# Patient Record
Sex: FEMALE | Race: WHITE | NOT HISPANIC OR LATINO | Employment: OTHER | URBAN - METROPOLITAN AREA
[De-identification: names, ages, dates, MRNs, and addresses within clinical notes are randomized per-mention and may not be internally consistent; named-entity substitution may affect disease eponyms.]

---

## 2017-04-15 ENCOUNTER — APPOINTMENT (OUTPATIENT)
Dept: CT IMAGING | Facility: CLINIC | Age: 82
End: 2017-04-15
Attending: EMERGENCY MEDICINE
Payer: MEDICARE

## 2017-04-15 ENCOUNTER — APPOINTMENT (OUTPATIENT)
Dept: MRI IMAGING | Facility: CLINIC | Age: 82
End: 2017-04-15
Attending: EMERGENCY MEDICINE
Payer: MEDICARE

## 2017-04-15 ENCOUNTER — HOSPITAL ENCOUNTER (EMERGENCY)
Facility: CLINIC | Age: 82
Discharge: HOME | End: 2017-04-15
Attending: EMERGENCY MEDICINE | Admitting: EMERGENCY MEDICINE
Payer: MEDICARE

## 2017-04-15 VITALS
BODY MASS INDEX: 19.07 KG/M2 | WEIGHT: 101 LBS | SYSTOLIC BLOOD PRESSURE: 148 MMHG | OXYGEN SATURATION: 98 % | HEIGHT: 61 IN | HEART RATE: 54 BPM | TEMPERATURE: 98.4 F | DIASTOLIC BLOOD PRESSURE: 84 MMHG | RESPIRATION RATE: 15 BRPM

## 2017-04-15 DIAGNOSIS — R26.9 ABNORMALITY OF GAIT: ICD-10-CM

## 2017-04-15 DIAGNOSIS — R42 DIZZINESS: ICD-10-CM

## 2017-04-15 DIAGNOSIS — I65.21 INTERNAL CAROTID ARTERY STENOSIS, RIGHT: ICD-10-CM

## 2017-04-15 DIAGNOSIS — R51.9 NONINTRACTABLE EPISODIC HEADACHE, UNSPECIFIED HEADACHE TYPE: ICD-10-CM

## 2017-04-15 LAB
ALBUMIN SERPL-MCNC: 3.8 G/DL (ref 3.4–5)
ALBUMIN UR-MCNC: NEGATIVE MG/DL
ALP SERPL-CCNC: 57 U/L (ref 40–150)
ALT SERPL W P-5'-P-CCNC: 23 U/L (ref 0–50)
ANION GAP SERPL CALCULATED.3IONS-SCNC: 8 MMOL/L (ref 3–14)
APPEARANCE UR: CLEAR
AST SERPL W P-5'-P-CCNC: 20 U/L (ref 0–45)
BASOPHILS # BLD AUTO: 0 10E9/L (ref 0–0.2)
BASOPHILS NFR BLD AUTO: 0.6 %
BILIRUB SERPL-MCNC: 0.9 MG/DL (ref 0.2–1.3)
BILIRUB UR QL STRIP: NEGATIVE
BUN SERPL-MCNC: 30 MG/DL (ref 7–30)
CALCIUM SERPL-MCNC: 9.3 MG/DL (ref 8.5–10.1)
CHLORIDE SERPL-SCNC: 103 MMOL/L (ref 94–109)
CO2 SERPL-SCNC: 27 MMOL/L (ref 20–32)
COLOR UR AUTO: ABNORMAL
CREAT SERPL-MCNC: 1.05 MG/DL (ref 0.52–1.04)
DIFFERENTIAL METHOD BLD: ABNORMAL
EOSINOPHIL # BLD AUTO: 0.2 10E9/L (ref 0–0.7)
EOSINOPHIL NFR BLD AUTO: 3 %
ERYTHROCYTE [DISTWIDTH] IN BLOOD BY AUTOMATED COUNT: 12.5 % (ref 10–15)
GFR SERPL CREATININE-BSD FRML MDRD: 50 ML/MIN/1.7M2
GLUCOSE SERPL-MCNC: 110 MG/DL (ref 70–99)
GLUCOSE UR STRIP-MCNC: NEGATIVE MG/DL
HCT VFR BLD AUTO: 40.9 % (ref 35–47)
HGB BLD-MCNC: 14.5 G/DL (ref 11.7–15.7)
HGB UR QL STRIP: NEGATIVE
IMM GRANULOCYTES # BLD: 0 10E9/L (ref 0–0.4)
IMM GRANULOCYTES NFR BLD: 0.3 %
INTERPRETATION ECG - MUSE: NORMAL
KETONES UR STRIP-MCNC: NEGATIVE MG/DL
LEUKOCYTE ESTERASE UR QL STRIP: ABNORMAL
LYMPHOCYTES # BLD AUTO: 1.6 10E9/L (ref 0.8–5.3)
LYMPHOCYTES NFR BLD AUTO: 24.3 %
MCH RBC QN AUTO: 33.4 PG (ref 26.5–33)
MCHC RBC AUTO-ENTMCNC: 35.5 G/DL (ref 31.5–36.5)
MCV RBC AUTO: 94 FL (ref 78–100)
MONOCYTES # BLD AUTO: 0.6 10E9/L (ref 0–1.3)
MONOCYTES NFR BLD AUTO: 8.9 %
NEUTROPHILS # BLD AUTO: 4.2 10E9/L (ref 1.6–8.3)
NEUTROPHILS NFR BLD AUTO: 62.9 %
NITRATE UR QL: NEGATIVE
NRBC # BLD AUTO: 0 10*3/UL
NRBC BLD AUTO-RTO: 0 /100
NT-PROBNP SERPL-MCNC: 265 PG/ML (ref 0–1800)
PH UR STRIP: 6.5 PH (ref 5–7)
PLATELET # BLD AUTO: 233 10E9/L (ref 150–450)
POTASSIUM SERPL-SCNC: 4 MMOL/L (ref 3.4–5.3)
PROT SERPL-MCNC: 6.8 G/DL (ref 6.8–8.8)
RBC # BLD AUTO: 4.34 10E12/L (ref 3.8–5.2)
RBC #/AREA URNS AUTO: 0 /HPF (ref 0–2)
SODIUM SERPL-SCNC: 138 MMOL/L (ref 133–144)
SP GR UR STRIP: 1.01 (ref 1–1.03)
TROPONIN I SERPL-MCNC: NORMAL UG/L (ref 0–0.04)
URN SPEC COLLECT METH UR: ABNORMAL
UROBILINOGEN UR STRIP-MCNC: NORMAL MG/DL (ref 0–2)
WBC # BLD AUTO: 6.7 10E9/L (ref 4–11)
WBC #/AREA URNS AUTO: 1 /HPF (ref 0–2)

## 2017-04-15 PROCEDURE — 81001 URINALYSIS AUTO W/SCOPE: CPT | Performed by: EMERGENCY MEDICINE

## 2017-04-15 PROCEDURE — 96375 TX/PRO/DX INJ NEW DRUG ADDON: CPT | Mod: 59

## 2017-04-15 PROCEDURE — 99285 EMERGENCY DEPT VISIT HI MDM: CPT | Mod: 25

## 2017-04-15 PROCEDURE — 96361 HYDRATE IV INFUSION ADD-ON: CPT

## 2017-04-15 PROCEDURE — 25000125 ZZHC RX 250: Performed by: EMERGENCY MEDICINE

## 2017-04-15 PROCEDURE — 70498 CT ANGIOGRAPHY NECK: CPT

## 2017-04-15 PROCEDURE — 25000128 H RX IP 250 OP 636: Performed by: EMERGENCY MEDICINE

## 2017-04-15 PROCEDURE — 83880 ASSAY OF NATRIURETIC PEPTIDE: CPT | Performed by: EMERGENCY MEDICINE

## 2017-04-15 PROCEDURE — 93005 ELECTROCARDIOGRAM TRACING: CPT

## 2017-04-15 PROCEDURE — 25500064 ZZH RX 255 OP 636: Performed by: EMERGENCY MEDICINE

## 2017-04-15 PROCEDURE — 84484 ASSAY OF TROPONIN QUANT: CPT | Performed by: EMERGENCY MEDICINE

## 2017-04-15 PROCEDURE — A9585 GADOBUTROL INJECTION: HCPCS | Performed by: EMERGENCY MEDICINE

## 2017-04-15 PROCEDURE — 80053 COMPREHEN METABOLIC PANEL: CPT | Performed by: EMERGENCY MEDICINE

## 2017-04-15 PROCEDURE — 25000131 ZZH RX MED GY IP 250 OP 636 PS 637: Mod: GY | Performed by: EMERGENCY MEDICINE

## 2017-04-15 PROCEDURE — 96374 THER/PROPH/DIAG INJ IV PUSH: CPT | Mod: 59

## 2017-04-15 PROCEDURE — 70450 CT HEAD/BRAIN W/O DYE: CPT | Mod: XS

## 2017-04-15 PROCEDURE — A9270 NON-COVERED ITEM OR SERVICE: HCPCS | Mod: GY | Performed by: EMERGENCY MEDICINE

## 2017-04-15 PROCEDURE — 85025 COMPLETE CBC W/AUTO DIFF WBC: CPT | Performed by: EMERGENCY MEDICINE

## 2017-04-15 PROCEDURE — 70553 MRI BRAIN STEM W/O & W/DYE: CPT

## 2017-04-15 RX ORDER — ONDANSETRON 2 MG/ML
4 INJECTION INTRAMUSCULAR; INTRAVENOUS EVERY 30 MIN PRN
Status: DISCONTINUED | OUTPATIENT
Start: 2017-04-15 | End: 2017-04-15 | Stop reason: HOSPADM

## 2017-04-15 RX ORDER — IOPAMIDOL 755 MG/ML
70 INJECTION, SOLUTION INTRAVASCULAR ONCE
Status: COMPLETED | OUTPATIENT
Start: 2017-04-15 | End: 2017-04-15

## 2017-04-15 RX ORDER — MECLIZINE HYDROCHLORIDE 25 MG/1
25 TABLET ORAL ONCE
Status: COMPLETED | OUTPATIENT
Start: 2017-04-15 | End: 2017-04-15

## 2017-04-15 RX ORDER — LORAZEPAM 2 MG/ML
0.5 INJECTION INTRAMUSCULAR ONCE
Status: COMPLETED | OUTPATIENT
Start: 2017-04-15 | End: 2017-04-15

## 2017-04-15 RX ORDER — LIDOCAINE 40 MG/G
CREAM TOPICAL
Status: DISCONTINUED | OUTPATIENT
Start: 2017-04-15 | End: 2017-04-15 | Stop reason: HOSPADM

## 2017-04-15 RX ORDER — GADOBUTROL 604.72 MG/ML
5 INJECTION INTRAVENOUS ONCE
Status: COMPLETED | OUTPATIENT
Start: 2017-04-15 | End: 2017-04-15

## 2017-04-15 RX ADMIN — ONDANSETRON 4 MG: 2 SOLUTION INTRAMUSCULAR; INTRAVENOUS at 12:11

## 2017-04-15 RX ADMIN — SODIUM CHLORIDE 100 ML: 9 INJECTION, SOLUTION INTRAVENOUS at 11:37

## 2017-04-15 RX ADMIN — LORAZEPAM 0.5 MG: 2 INJECTION INTRAMUSCULAR; INTRAVENOUS at 13:44

## 2017-04-15 RX ADMIN — MECLIZINE HYDROCHLORIDE 25 MG: 25 TABLET ORAL at 10:51

## 2017-04-15 RX ADMIN — IOPAMIDOL 70 ML: 755 INJECTION, SOLUTION INTRAVENOUS at 11:38

## 2017-04-15 RX ADMIN — GADOBUTROL 5 ML: 604.72 INJECTION INTRAVENOUS at 14:28

## 2017-04-15 RX ADMIN — SODIUM CHLORIDE 1000 ML: 9 INJECTION, SOLUTION INTRAVENOUS at 10:47

## 2017-04-15 ASSESSMENT — ENCOUNTER SYMPTOMS
PALPITATIONS: 1
VOMITING: 0
ABDOMINAL PAIN: 0
ACTIVITY CHANGE: 0
HEADACHES: 1
DIZZINESS: 1
SHORTNESS OF BREATH: 0
WEAKNESS: 1
LIGHT-HEADEDNESS: 1
BACK PAIN: 1
NAUSEA: 1
FEVER: 0
CONSTIPATION: 1

## 2017-04-15 NOTE — DISCHARGE INSTRUCTIONS
Managing Balance Problems: Vestibular Rehabilitation Therapy  An inner ear problem can knock out part of the balance system. Vestibular rehabilitation therapy helps you learn how to rely on specific parts of your balance system.  Checking eye movement  The therapist will check for nystagmus. This is an automatic, jumpy eye movement. Nystagmus in certain positions can indicate an inner ear problem. It can even show which semicircular canal is affected. The therapist will watch your eyes while you move into different positions.  Treating your condition    Treatment can include:    Canalith repositioning, a series of guided head and body movements. It helps move crystals, easing benign positional vertigo (BPV) symptoms.    Habituation exercises to retrain your balance system. The therapist will teach you how to do these exercises at home.    Gaze stabilization exercises to retrain the eyes to stay in focus while the head moves. This helps ease dysequilibrium.    Gait and balance training, which includes standing and walking on different surfaces. The therapist can teach you how to maintain balance and prevent falls.  Doing habituation exercises  The therapist will teach exercises suited to your condition. Habituation exercises will make you dizzy at first. Just remind yourself that symptoms probably will last for only a minute. If you keep doing the exercises, they will help lessen your dizziness. Then, when you perform a similar movement in daily life, it is less likely to provoke symptoms.  Getting back into action  Dizziness doesn't have to keep you from exercising. Start with activities that don't trigger episodes. Then ease into more challenging activities. Try these tips:    Always exercise with a partner.    Stop if you have nausea or faintness.    Walk on a treadmill, holding on to the handles for support.    Use a ball machine for sports like tennis until you're ready for a live game. This way you know where  to expect the ball.    Don't give up. With time, most people can return to activities and sports.    1809-5042 [employer]. [address] [place] [address], [place] [address]. All rights reserved. This information is not intended as a substitute for professional medical care. Always follow your healthcare professional's instructions.          Dizziness (Vertigo) and Balance Problems: Ensuring Your Safety  Falls or accidents can lead to pain, broken bones, and fear of future falls. Protect yourself and others by preparing for episodes. Simple steps can help increase your safety at home and wherever you go.  Lighting    Keep all areas well lit. This helps your eyes send the right signals to the brain. It also makes you less likely to trip and fall. If bright lights make symptoms worse, dim the lights or lie in a dark room until the dizziness passes. Then turn the lights back to their normal level.  Tips:    Keep a flashlight by the bed.    Place nightlights in bathrooms and hallways.    Replace burned-out bulbs, or have someone replace them for you.  Fall prevention  To reduce your risk of falling:    Rise out of a bed or chair slowly.    Wear low-heeled shoes that fit properly and have slip-resistant soles.    Remove throw rugs. Clear clutter from walkways.    Use handrails on stairs. Have handrails installed or adjusted if needed.    Install grab bars in the bathroom. Don't use towel racks for balance.    Use a shower stool. Also, apply adhesive strips to the shower or tub floor.  Going out  With a little time and preparation, you can get around safely.  Tips:    Bring a cane or walking aid if needed.    Give yourself plenty of time in case a dizziness episode begins.    Be patient. If an activity like walking through a crowded shop causes you stress, you may not be ready for it yet.  Driving  If you become dizzy or disoriented while driving, you could hurt yourself and others. That's why it's best to avoid  driving until symptoms subside. In some cases, your license may be temporarily held until it's safe for you to drive again.  For safety:    Ask a friend to drive for you.    Take public transportation.    Walk to stores and other places when you can.  Asking for help  Don't be afraid to ask for help running errands, cooking meals, and doing exercise. Whether it's a friend, loved one, neighbor, or stranger on the street, a little help can make a world of difference.     3560-5344 [employer]. [address] [place] [address]. All rights reserved. This information is not intended as a substitute for professional medical care. Always follow your healthcare professional's instructions.          Dizziness (Uncertain Cause)  Dizziness is a common symptom. It may be described as lightheadedness, spinning, or feeling like you are going to faint. Dizziness can have many causes.  Be sure to tell the healthcare provider about:    All medicines you take, including prescription, over-the-counter, herbs, and supplements    Any other symptoms you have    Any health problems you are being treated for    Anything that causes the dizziness to get worse or better  Today's exam did not show an exact cause for your dizziness. Other tests may be needed. Follow up with your healthcare provider.  Home care    Dizziness that occurs with sudden standing may be a sign of mild dehydration. Drink extra fluids for the next few days.    If you recently started a new medicine, stopped a medicine, or had the dose of a current medicine changed, talk with the prescribing healthcare provider. Your medicine plan may need adjustment.    If dizziness lasts more than a few seconds, sit or lie down until it passes. This may help prevent injury in case you pass out.    Do not drive or use power tools or dangerous equipment until you have had no dizziness for at least 48 hours.  Follow-up care  Follow up with your healthcare provider  for further evaluation within the next 7 days or as advised.  When to seek medical advice  Call your healthcare provider for any of the following:    Worsening of symptoms or new symptoms    Passing out or seizure    Repeated vomiting    Headache    Palpitations (the sense that your heart is fluttering or beating fast or hard)    Shortness of breath    Blood in vomit or stool (black or red color)    Weakness of an arm or leg or one side of the face    Vision or hearing changes    Trouble walking or speaking    Chest, arm, neck, back, or jaw pain       3158-7358 [employer]. [address] [place], [place] [id number]. All rights reserved. This information is not intended as a substitute for professional medical care. Always follow your healthcare professional's instructions.

## 2017-04-15 NOTE — ED PROVIDER NOTES
"  History     Chief Complaint:  Palpitations      HPI   Susy Chavarria is a 85 year old female with a history of hypertension who presents with palpitations.  The patient reports about 3 weeks of feeling generally \"lousy\" with symptoms including intermittent dizziness, nausea, headache over her left forehead and down into the left side of her face, and palpitations which she describes as feeling her heartbeat pulsing in her chest and abdomen.  She has felt unsteady, wobbly, and weak when walking, like she might pass out although she has not actually done so.  She denies any sense of the room spinning or history of vertigo.  She has noticed her heart rate is sometimes irregular when she checks it on her machine at home although this does not correlate with the presence of her symptoms.  She is having these symptoms daily and over the past few days she has noticed worse symptoms in the morning which then improve as the day passes.  She has been seen in clinic twice for these same constellation of symptoms without specific cause found.  Her headache does feel somewhat like a sinus infection to her and is worse at night, sometimes waking her from sleep.  She was not treated with antibiotics as this was thought to be viral based on timing and an elevated lymphocyte count.  Her Metoprolol was recently changed from 50 mg at night to 25 mg twice a day.  She is unclear exactly why the change was made, possibly as her blood pressure was still on the high side.  She has had no other recent medication changes.  She denies any chest pain, shortness of breath, abdominal pain, or vomiting.  She denies any history of atrial fibrillation and has not had a Holter monitor or other prolonged monitoring of her heart.  She has had some blurring of her vision, worse in her right eye.  She had an eye exam last week which was normal, other than needing a new prescription for glasses.  She normally takes stool softeners due to chronic " "issues with constipation.  She has some low back pain from osteoarthritis that is unchanged from baseline.  She does not typically drink much water although has been trying to hydrate better.  She lives independently and is able to do her normal daily activities without difficulty.    Allergies:  No known drug allergies.     Medications:    Pravastatin  Metoprolol  Hydrochlorothiazide  Lisinopril   Aspirin  Omeprazole  Calcium carbonate-Vitamin D  Multivitamin     Past Medical History:    Hypertension  Hyperlipidemia     Past Surgical History:    Cataract removal  Joint replacement  Nephrectomy (donor)  Carpal tunnel release    Family History:    Cancer - father    Social History:  Marital Status:   Presents to the ED with her son.  Living Situation: senior independent living   Tobacco Use: No previous or current tobacco use.   Alcohol Use: Occasional alcohol use, about 1 drink per week  PCP: Inez Gonzales MD      Review of Systems   Constitutional: Negative for activity change and fever.   Eyes: Positive for visual disturbance.   Respiratory: Negative for shortness of breath.    Cardiovascular: Positive for palpitations. Negative for chest pain.   Gastrointestinal: Positive for constipation (chronic) and nausea. Negative for abdominal pain and vomiting.   Musculoskeletal: Positive for back pain (chronic).   Neurological: Positive for dizziness, weakness, light-headedness and headaches.   All other systems reviewed and are negative.    Physical Exam   First Vitals:  BP: 169/75  Pulse: 64  Temp: 98.4  F (36.9  C)  Resp: 16  Height: 154.9 cm (5' 1\")  Weight: 45.8 kg (101 lb)  SpO2: 95 %      Physical Exam  General: Patient is alert and normal appearing.  HEENT: Head atraumatic    Eyes: pupils equal and reactive. Conjunctiva clear   Nares: patent   Oropharynx: no lesions, uvula midline, no palatal draping, normal voice, no trismus  Neck: Supple without lymphadenopathy, no meningismus  Chest: Heart regular " rate and rhythm.   Lungs: Equal clear to auscultation with no wheeze or rales  Abdomen: Soft, non tender, nondistended, normal bowel sounds, no pulsatile mass, equal pulses bilaterally  Back: No costovertebral angle tenderness, no midline C, T or L spine tenderness  Neuro: Grossly nonfocal, normal speech, strength equal bilaterally, CN 2-12 intact, no pronator drift, normal finger to nose, + romberg, independent gait  Extremities: No deformities, equal radial and DP pulses. No clubbing, cyanosis.  No edema  Skin: Warm and dry with no rash.       Emergency Department Course   ECG:  @ 1019  Indication: palpitations  Vent. Rate 59 bpm. TX interval 182 ms. QRS duration 68 ms. QT/QTc 390/386 ms. P-R-T axis 44 -19 28.   Sinus bradycardia. Anterior infarct, age undetermined. Abnormal ECG.    Read @ 1025 by Dr. Virgen.     Imaging:  Head CT without contrast:  1. No acute abnormality.  2. Atrophy of the brain. White matter changes consistent with sequelae of small vessel ischemic disease.  Report per radiology.      Head/Neck CT angiogram with and without contrast:  1. No evidence of arterial occlusion.  2. There is 70% diameter stenosis of the proximal right internal carotid artery by NASCET criteria caused by atherosclerotic plaque.  3. Tortuous internal carotid and vertebral arteries.  Report per radiology.     Brain MRI without and with contrast:  1. No acute infarct or other abnormality.  2. Brain atrophy and white matter changes consistent with sequelae of small vessel ischemic disease.  Report per radiology.    Radiographic findings were communicated with the patient and family who voiced understanding of the findings.    Laboratory:  CBC: MCH 33.4 (H), otherwise WNL (WBC 6.7, HGB 14.5, )   CMP: Glucose 110 (H), Creatinine 1.05 (H), GFR 50 (L), otherwise WNL   Pro-BNP: 265  Troponin I: <0.015     UA: Clear light yellow urine, Leukocyte Esterase small, otherwise WNL     Interventions:  (1047) Normal Saline, 1  liter, IV bolus   (1051) Meclizine, 25 mg, PO  (1211) Zofran, 4 mg, IV injection   (1344) Ativan, 0.5 mg, IV injection     Emergency Department Course:  EKG was done in triage, interpretation as above.     Nursing notes and vitals reviewed.  I performed an exam of the patient as documented above.     A peripheral IV was established.  Blood was drawn and sent for laboratory testing, results as above. The patient was placed on continuous cardiac monitoring and pulse oximetry.     The patient was sent for a head CT and head/neck CT angiogram while in the emergency department, findings above.      Urine sample was obtained and sent for laboratory analysis, findings above.     The patient was sent for a brain MRI while in the emergency department, findings above.      I spoke with Dr. Bai of neurology regarding the patient.    Findings and plan explained to the patient and family. Patient discharged home with instructions regarding supportive care, medications, and reasons to return. The importance of close follow-up was reviewed.      Impression & Plan      Medical Decision Making:  Patient is an 85 year old female who lives independently, presents with vague symptoms of dizziness, imbalance, left sided headache, and intermittent nausea.  She denies chest pain, shortness of breath, abdominal pain, or pain radiating through to her back.  She denies any numbness, weakness, change in her vision or speech.  No history of vertigo in the past.  No falls or head injury.  Has seen her primary doctor for this a couple of times, initially thought to be a sinusitis and then thought to be medication related.  Additionally she has had symptoms of irregular heart rhythm readings on her monitor at home and feelings of palpitations.  Here in the emergency department she has actually been observed for about 6 hours to do her entire workup.  Her lowest heart rate was sinus bradycardia at 54, otherwise she has been in the upper 59's  to low 60's and in sinus rhythm for that entire time.  Her blood pressures have been very stable, most recently 148/84.  Her oxygen saturations on room air have been normal.  She had a very normal neurologic exam except for a positive Romberg.  After a dose of Meclizine she did have some imbalance but that improved during her stay in the emergency department.  Nausea was better with a dose of Zofran.  She did have a CT head that was negative for acute intracranial pathology.  CTA demonstrated 70% right internal carotid stenosis and she underwent MRI to ensure that there was no infarct and it was negative for acute infarct.  Patient's EKG had no new ischemic changes and just showed sinus bradycardia at a rate of 59.  Troponin was negative.  BNP was normal.  Electrolytes were within normal limits.  Her creatinine was 1.05 and she is known to have a solitary kidney.  That likely represents that and is baseline for her.  Urinalysis is negative for signs of infection.  She is not anemic with a hemoglobin of 14.5.  Her left sided headache: she has no tenderness to palpation to suggest temporal arteritis.  Her symptoms, she feels, are worse in the morning but she recently cut her evening dose of Metoprolol and takes a half dose in the morning so potentially that is a contributing factor for the patient.  I do not believe this consistent with acute coronary syndrome.  I did consider abdominal aortic aneurysm or dissection but do not feel her symptoms are consistent with this.  It has been 3 weeks of symptoms and I do not believe this to be PE or DVT as the likely cause of her symptoms.  I discussed with her following up with neurology as an outpatient.  After I spoke with them here in the ER, they did not feel the stenosis would be enough to be symptomatic.  She can follow up in the clinic.  I also recommended her to follow up with ear, nose, and throat for further vertigo workup.  She is going to stay with her son who is a  family practice physician tonight.  She was offered an observation stay for prolonged telemetry monitoring with this history of irregular rhythms but given 6 hours with no irregularity and feeling the symptoms while she is here, I think it is unlikely that that is a cause of her symptoms and her son did not feel that observation stay was necessary at this time.  She will follow up with her primary care physician on Monday for further workup and evaluation.  I asked them to consider outpatient stress test and echocardiogram as well and they will discuss that with her primary physician.  All patient's questions and concerns and addressed and return precautions to the ER were reviewed at length.      Diagnosis:    ICD-10-CM    1. Dizziness R42    2. Abnormality of gait R26.9    3. Nonintractable episodic headache, unspecified headache type R51    4. Internal carotid artery stenosis, right I65.21      Disposition:  Discharged to home.     Discharge Medications:  None       ISherry, am serving as a scribe on 4/15/2017 at 10:37 AM to personally document services performed by Dr. Virgen based on my observations and the provider's statements to me.    Sherry Melton  4/15/2017    EMERGENCY DEPARTMENT       Dianne Virgen MD  04/15/17 [id number]

## 2017-04-15 NOTE — ED AVS SNAPSHOT
Emergency Department    [address] [place] [id number]    Phone:  [phone]    Fax:  [phone]                                       Susy Chavarria   MRN: [id number]    Department:   Emergency Department   Date of Visit:  4/15/2017           After Visit Summary Signature Page     I have received my discharge instructions, and my questions have been answered. I have discussed any challenges I see with this plan with the nurse or doctor.    ..........................................................................................................................................  Patient/Patient Representative Signature      ..........................................................................................................................................  Patient Representative Print Name and Relationship to Patient    ..................................................               ................................................  Date                                            Time    ..........................................................................................................................................  Reviewed by Signature/Title    ...................................................              ..............................................  Date                                                            Time

## 2017-04-15 NOTE — ED NOTES
Writer walked with patient to the bathroom and back to her room. Patient did well walking she mentioned she felt a little bit a dizziness, but mentioned that she felt save to go home.

## 2017-04-15 NOTE — ED AVS SNAPSHOT
Emergency Department    [address] [facility] [address]    Phone:  [phone]    Fax:  [phone]                                       Susy Chavarria   MRN: [id number]    Department:   Emergency Department   Date of Visit:  4/15/2017           Patient Information     Date Of Birth          1/24/1932        Your diagnoses for this visit were:     Dizziness     Abnormality of gait     Nonintractable episodic headache, unspecified headache type     Internal carotid artery stenosis, right        You were seen by Dianne Virgen MD.      Follow-up Information     Follow up with Neurology, [facility].    Contact information:    [address]  [address]  [facility] [address] [phone]          Follow up with Anisa Queen MD.    Specialty:  Otolaryngology    Contact information:    [facility]  [address]  [facility] [id number] [phone]          Follow up with Inez Gonzales MD.    Specialty:  Family Practice    Contact information:    [email or web address]  [address]  [facility] [address] [phone]          Follow up with  Emergency Department.    Specialty:  EMERGENCY MEDICINE    Why:  If symptoms worsen    Contact information:    [address] [facility] [address] [phone]        Discharge Instructions         Managing Balance Problems: Vestibular Rehabilitation Therapy  An inner ear problem can knock out part of the balance system. Vestibular rehabilitation therapy helps you learn how to rely on specific parts of your balance system.  Checking eye movement  The therapist will check for nystagmus. This is an automatic, jumpy eye movement. Nystagmus in certain positions can indicate an inner ear problem. It can even show which semicircular canal is affected. The therapist will watch your eyes while you move into different positions.  Treating your condition    Treatment can include:    Canalith repositioning, a  series of guided head and body movements. It helps move crystals, easing benign positional vertigo (BPV) symptoms.    Habituation exercises to retrain your balance system. The therapist will teach you how to do these exercises at home.    Gaze stabilization exercises to retrain the eyes to stay in focus while the head moves. This helps ease dysequilibrium.    Gait and balance training, which includes standing and walking on different surfaces. The therapist can teach you how to maintain balance and prevent falls.  Doing habituation exercises  The therapist will teach exercises suited to your condition. Habituation exercises will make you dizzy at first. Just remind yourself that symptoms probably will last for only a minute. If you keep doing the exercises, they will help lessen your dizziness. Then, when you perform a similar movement in daily life, it is less likely to provoke symptoms.  Getting back into action  Dizziness doesn't have to keep you from exercising. Start with activities that don't trigger episodes. Then ease into more challenging activities. Try these tips:    Always exercise with a partner.    Stop if you have nausea or faintness.    Walk on a treadmill, holding on to the handles for support.    Use a ball machine for sports like tennis until you're ready for a live game. This way you know where to expect the ball.    Don't give up. With time, most people can return to activities and sports.    0373-0909 [employer]. [address] [place] [address], [place] [address]. All rights reserved. This information is not intended as a substitute for professional medical care. Always follow your healthcare professional's instructions.          Dizziness (Vertigo) and Balance Problems: Ensuring Your Safety  Falls or accidents can lead to pain, broken bones, and fear of future falls. Protect yourself and others by preparing for episodes. Simple steps can help increase your safety at home and wherever you  go.  Lighting    Keep all areas well lit. This helps your eyes send the right signals to the brain. It also makes you less likely to trip and fall. If bright lights make symptoms worse, dim the lights or lie in a dark room until the dizziness passes. Then turn the lights back to their normal level.  Tips:    Keep a flashlight by the bed.    Place nightlights in bathrooms and hallways.    Replace burned-out bulbs, or have someone replace them for you.  Fall prevention  To reduce your risk of falling:    Rise out of a bed or chair slowly.    Wear low-heeled shoes that fit properly and have slip-resistant soles.    Remove throw rugs. Clear clutter from walkways.    Use handrails on stairs. Have handrails installed or adjusted if needed.    Install grab bars in the bathroom. Don't use towel racks for balance.    Use a shower stool. Also, apply adhesive strips to the shower or tub floor.  Going out  With a little time and preparation, you can get around safely.  Tips:    Bring a cane or walking aid if needed.    Give yourself plenty of time in case a dizziness episode begins.    Be patient. If an activity like walking through a crowded shop causes you stress, you may not be ready for it yet.  Driving  If you become dizzy or disoriented while driving, you could hurt yourself and others. That's why it's best to avoid driving until symptoms subside. In some cases, your license may be temporarily held until it's safe for you to drive again.  For safety:    Ask a friend to drive for you.    Take public transportation.    Walk to stores and other places when you can.  Asking for help  Don't be afraid to ask for help running errands, cooking meals, and doing exercise. Whether it's a friend, loved one, neighbor, or stranger on the street, a little help can make a world of difference.     4508-1187 [employer]. [address] [place] [address], [place] [address]. All rights reserved. This information is not intended as a  substitute for professional medical care. Always follow your healthcare professional's instructions.          Dizziness (Uncertain Cause)  Dizziness is a common symptom. It may be described as lightheadedness, spinning, or feeling like you are going to faint. Dizziness can have many causes.  Be sure to tell the healthcare provider about:    All medicines you take, including prescription, over-the-counter, herbs, and supplements    Any other symptoms you have    Any health problems you are being treated for    Anything that causes the dizziness to get worse or better  Today's exam did not show an exact cause for your dizziness. Other tests may be needed. Follow up with your healthcare provider.  Home care    Dizziness that occurs with sudden standing may be a sign of mild dehydration. Drink extra fluids for the next few days.    If you recently started a new medicine, stopped a medicine, or had the dose of a current medicine changed, talk with the prescribing healthcare provider. Your medicine plan may need adjustment.    If dizziness lasts more than a few seconds, sit or lie down until it passes. This may help prevent injury in case you pass out.    Do not drive or use power tools or dangerous equipment until you have had no dizziness for at least 48 hours.  Follow-up care  Follow up with your healthcare provider for further evaluation within the next 7 days or as advised.  When to seek medical advice  Call your healthcare provider for any of the following:    Worsening of symptoms or new symptoms    Passing out or seizure    Repeated vomiting    Headache    Palpitations (the sense that your heart is fluttering or beating fast or hard)    Shortness of breath    Blood in vomit or stool (black or red color)    Weakness of an arm or leg or one side of the face    Vision or hearing changes    Trouble walking or speaking    Chest, arm, neck, back, or jaw pain       6278-2409 [employer]. [address] [place] [address]  Road, [place] [address]. All rights reserved. This information is not intended as a substitute for professional medical care. Always follow your healthcare professional's instructions.          24 Hour Appointment Hotline       To make an appointment at any [facility], call [phone] ([phone]). If you don't have a family doctor or clinic, we will help you find one. [facility] are conveniently located to serve the needs of you and your family.             Review of your medicines      Our records show that you are taking the medicines listed below. If these are incorrect, please call your family doctor or clinic.        Dose / Directions Last dose taken    aspirin 81 MG tablet   Dose:  1 tablet        Take 1 tablet by mouth daily.   Refills:  0        atenolol 50 MG tablet   Commonly known as:  TENORMIN   Dose:  50 mg        Take 50 mg by mouth daily.   Refills:  0        CALCIUM 1200 PO        Take  by mouth.   Refills:  0        hydrochlorothiazide 25 MG tablet   Commonly known as:  HYDRODIURIL   Dose:  25 mg        Take 25 mg by mouth daily.   Refills:  0        MULTIVITAMINS PO        Take  by mouth.   Refills:  0        omeprazole 20 MG CR capsule   Commonly known as:  priLOSEC   Dose:  20 mg        Take 20 mg by mouth daily.   Refills:  0        pravastatin 20 MG tablet   Commonly known as:  PRAVACHOL   Dose:  20 mg        Take 20 mg by mouth daily.   Refills:  0                Procedures and tests performed during your visit     CBC with platelets differential    CT Head w/o Contrast    Cardiac Continuous Monitoring    Comprehensive metabolic panel    EKG 12-lead, tracing only    Head/neck angiogram CT  w & w/o contrast    MR Brain w/o & w Contrast    Nt probnp inpatient (BNP)    Peripheral IV catheter    Troponin I    UA reflex to Microscopic and Culture      Orders Needing Specimen Collection     None      Pending Results     No orders found from 4/13/2017 to 4/16/2017.             Pending Culture Results     No orders found from 4/13/2017 to 4/16/2017.            Test Results From Your Hospital Stay        4/15/2017 10:57 AM      Component Results     Component Value Ref Range & Units Status    WBC 6.7 4.0 - 11.0 10e9/L Final    RBC Count 4.34 3.8 - 5.2 10e12/L Final    Hemoglobin 14.5 11.7 - 15.7 g/dL Final    Hematocrit 40.9 35.0 - 47.0 % Final    MCV 94 78 - 100 fl Final    MCH 33.4 (H) 26.5 - 33.0 pg Final    MCHC 35.5 31.5 - 36.5 g/dL Final    RDW 12.5 10.0 - 15.0 % Final    Platelet Count 233 150 - 450 10e9/L Final    Diff Method Automated Method  Final    % Neutrophils 62.9 % Final    % Lymphocytes 24.3 % Final    % Monocytes 8.9 % Final    % Eosinophils 3.0 % Final    % Basophils 0.6 % Final    % Immature Granulocytes 0.3 % Final    Nucleated RBCs 0 0 /100 Final    Absolute Neutrophil 4.2 1.6 - 8.3 10e9/L Final    Absolute Lymphocytes 1.6 0.8 - 5.3 10e9/L Final    Absolute Monocytes 0.6 0.0 - 1.3 10e9/L Final    Absolute Eosinophils 0.2 0.0 - 0.7 10e9/L Final    Absolute Basophils 0.0 0.0 - 0.2 10e9/L Final    Abs Immature Granulocytes 0.0 0 - 0.4 10e9/L Final    Absolute Nucleated RBC 0.0  Final         4/15/2017 11:17 AM      Component Results     Component Value Ref Range & Units Status    Sodium 138 133 - 144 mmol/L Final    Potassium 4.0 3.4 - 5.3 mmol/L Final    Chloride 103 94 - 109 mmol/L Final    Carbon Dioxide 27 20 - 32 mmol/L Final    Anion Gap 8 3 - 14 mmol/L Final    Glucose 110 (H) 70 - 99 mg/dL Final    Urea Nitrogen 30 7 - 30 mg/dL Final    Creatinine 1.05 (H) 0.52 - 1.04 mg/dL Final    GFR Estimate 50 (L) >60 mL/min/1.7m2 Final    Non  GFR Calc    GFR Estimate If Black 60 (L) >60 mL/min/1.7m2 Final    African American GFR Calc    Calcium 9.3 8.5 - 10.1 mg/dL Final    Bilirubin Total 0.9 0.2 - 1.3 mg/dL Final    Albumin 3.8 3.4 - 5.0 g/dL Final    Protein Total 6.8 6.8 - 8.8 g/dL Final    Alkaline Phosphatase 57 40 - 150 U/L Final    ALT 23 0 - 50  U/L Final    AST 20 0 - 45 U/L Final         4/15/2017 11:17 AM      Component Results     Component Value Ref Range & Units Status    Troponin I ES  0.000 - 0.045 ug/L Final    <0.015  The 99th percentile for upper reference range is 0.045 ug/L.  Troponin values in   the range of 0.045 - 0.120 ug/L may be associated with risks of adverse   clinical events.           4/15/2017 11:17 AM      Component Results     Component Value Ref Range & Units Status    N-Terminal Pro BNP Inpatient 265 0 - 1800 pg/mL Final    Reference range shown and results flagged as abnormal are suggested inpatient   cut points for confirming diagnosis if CHF in an acute setting. Establishing   a   baseline value for each individual patient is useful for follow-up. An   inpatient or emergency department NT-proPBNP <300 pg/mL effectively rules out   acute CHF, with 99% negative predictive value.  The outpatient non-acute reference range for ruling out CHF is:   0-125 pg/mL (age 18 to less than 75)   0-450 pg/mL (age 75 yrs and older)           4/15/2017 12:13 PM      Component Results     Component Value Ref Range & Units Status    Color Urine Light Yellow  Final    Appearance Urine Clear  Final    Glucose Urine Negative NEG mg/dL Final    Bilirubin Urine Negative NEG Final    Ketones Urine Negative NEG mg/dL Final    Specific Gravity Urine 1.009 1.003 - 1.035 Final    Blood Urine Negative NEG Final    pH Urine 6.5 5.0 - 7.0 pH Final    Protein Albumin Urine Negative NEG mg/dL Final    Urobilinogen mg/dL Normal 0.0 - 2.0 mg/dL Final    Nitrite Urine Negative NEG Final    Leukocyte Esterase Urine Small (A) NEG Final    Source Midstream Urine  Final    RBC Urine 0 0 - 2 /HPF Final    WBC Urine 1 0 - 2 /HPF Final         4/15/2017  2:32 PM      Narrative     CT SCAN OF THE HEAD WITHOUT CONTRAST   4/15/2017 11:48 AM     HISTORY: Imbalance, dizziness    TECHNIQUE: Axial images of the head and coronal reformations without  IV contrast material.  Radiation dose for this scan was reduced using  automated exposure control, adjustment of the mA and/or kV according  to patient size, or iterative reconstruction technique.    COMPARISON: None.    FINDINGS: There is generalized atrophy of the brain. There is low  attenuation in the white matter of the cerebral hemispheres consistent  with sequelae of small vessel ischemic disease. There is no evidence  of intracranial hemorrhage, mass, acute infarct or anomaly.     The visualized portions of the sinuses and mastoids appear normal.  There is no evidence of trauma.        Impression     IMPRESSION:   1. No acute abnormality.  2. Atrophy of the brain. White matter changes consistent with sequelae  of small vessel ischemic disease.      NABOR LYNCH MD         4/15/2017  2:32 PM      Narrative     CT ANGIOGRAM OF THE HEAD AND NECK WITHOUT AND WITH CONTRAST  4/15/2017  11:48 AM     HISTORY: Unsteady gait, dizziness.    TECHNIQUE: Precontrast localizing scans were followed by CT  angiography with an injection of 70mL [id number] IV with scans through  the head and neck. Images were transferred to a separate 3-D  workstation where multiplanar reformations and 3-D images were  created. Estimates of carotid stenoses are made relative to the distal  internal carotid artery diameters except as noted. Radiation dose for  this scan was reduced using automated exposure control, adjustment of  the mA and/or kV according to patient size, or iterative  reconstruction technique.    COMPARISON: None.    CT HEAD FINDINGS: No contrast enhancing lesions. Cerebral blood flow  is grossly normal.    CT ANGIOGRAM HEAD FINDINGS: Mild calcified atherosclerotic plaque left  carotid siphon. Arteries are widely patent with no aneurysm,  significant stenosis, occlusion or intraarterial thrombus. Venous  circulation is unremarkable.    CT ANGIOGRAM NECK FINDINGS:   Right carotid artery: Tortuous cervical internal carotid artery with  severe  calcified atherosclerotic plaque in the proximal internal  carotid. Residual luminal diameter is 1.3 mm compared with distal  internal carotid diameter of 4.5 mm, indicating 70% diameter stenosis  by NASCET criteria.      Left carotid artery: Tortuous cervical internal carotid artery.  Calcified atherosclerotic plaque in the proximal internal carotid. No  significant stenosis.      Vertebral arteries: Balanced circulation. Tortuous arteries. No  significant stenosis.      Other findings: None.        Impression     IMPRESSION:   1. No evidence of arterial occlusion.  2. There is 70% diameter stenosis of the proximal right internal  carotid artery by NASCET criteria caused by atherosclerotic plaque.  3. Tortuous internal carotid and vertebral arteries.      NABOR LYNCH MD         4/15/2017  4:06 PM      Narrative     MRI BRAIN WITHOUT AND WITH CONTRAST  4/15/2017 2:33 PM    HISTORY:  Imbalance, dizziness and angiogram showing 70% diameter  stenosis of proximal right internal carotid artery.    TECHNIQUE:  Multiplanar, multisequence MRI of the brain without and  with 5 mL Gadavist.    COMPARISON: CT angiogram today.    FINDINGS:  There is generalized atrophy of the brain.  White matter  changes are present in the cerebral hemispheres that are consistent  with small vessel ischemic disease in this age patient.  There is no  evidence of hemorrhage, mass, acute infarct, or anomaly.  There are no  gadolinium enhancing lesions.    There are bilateral intraocular lens implants. 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 The arteries at the base of the brain  and the dural venous sinuses appear patent.         Impression     IMPRESSION:  1. No acute infarct or other abnormality.  2. Brain atrophy and white matter changes consistent with sequelae of  small vessel ischemic disease.      NABOR LYNCH MD                Clinical Quality Measure: Blood Pressure Screening     Your blood pressure was checked  "while you were in the emergency department today. The last reading we obtained was  BP: 148/84 . Please read the guidelines below about what these numbers mean and what you should do about them.  If your systolic blood pressure (the top number) is less than 120 and your diastolic blood pressure (the bottom number) is less than 80, then your blood pressure is normal. There is nothing more that you need to do about it.  If your systolic blood pressure (the top number) is 120-139 or your diastolic blood pressure (the bottom number) is 80-89, your blood pressure may be higher than it should be. You should have your blood pressure rechecked within a year by a primary care provider.  If your systolic blood pressure (the top number) is 140 or greater or your diastolic blood pressure (the bottom number) is 90 or greater, you may have high blood pressure. High blood pressure is treatable, but if left untreated over time it can put you at risk for heart attack, stroke, or kidney failure. You should have your blood pressure rechecked by a primary care provider within the next 4 weeks.  If your provider in the emergency department today gave you specific instructions to follow-up with your doctor or provider even sooner than that, you should follow that instruction and not wait for up to 4 weeks for your follow-up visit.        Thank you for choosing [facility]       Thank you for choosing [facility] for your care. Our goal is always to provide you with excellent care. Hearing back from our patients is one way we can continue to improve our services. Please take a few minutes to complete the written survey that you may receive in the mail after you visit with us. Thank you!        [employer] Information     [employer] lets you send messages to your doctor, view your test results, renew your prescriptions, schedule appointments and more. To sign up, go to [email or web address] . Click on \"Log in\" on the left side of the screen, which " "will take you to the Welcome page. Then click on \"Sign up Now\" on the right side of the page.     You will be asked to enter the access code listed below, as well as some personal information. Please follow the directions to create your username and password.     Your access code is: [email or web address]  Expires: 2017  4:06 PM     Your access code will  in 90 days. If you need help or a new code, please call your [facility] clinic or [phone].        Care EveryWhere ID     This is your Care EveryWhere ID. This could be used by other organizations to access your [facility] medical records  [id number]        After Visit Summary       This is your record. Keep this with you and show to your community pharmacist(s) and doctor(s) at your next visit.                  "

## 2018-09-20 ENCOUNTER — HOSPITAL ENCOUNTER (OUTPATIENT)
Dept: CT IMAGING | Facility: CLINIC | Age: 83
Discharge: HOME | End: 2018-09-20
Attending: PSYCHIATRY & NEUROLOGY | Admitting: PSYCHIATRY & NEUROLOGY
Payer: MEDICARE

## 2018-09-20 DIAGNOSIS — I65.21 STENOSIS OF RIGHT CAROTID ARTERY: ICD-10-CM

## 2018-09-20 PROCEDURE — 25000128 H RX IP 250 OP 636: Performed by: PSYCHIATRY & NEUROLOGY

## 2018-09-20 PROCEDURE — 70498 CT ANGIOGRAPHY NECK: CPT

## 2018-09-20 PROCEDURE — 25000125 ZZHC RX 250: Performed by: PSYCHIATRY & NEUROLOGY

## 2018-09-20 RX ORDER — IOPAMIDOL 755 MG/ML
70 INJECTION, SOLUTION INTRAVASCULAR ONCE
Status: COMPLETED | OUTPATIENT
Start: 2018-09-20 | End: 2018-09-20

## 2018-09-20 RX ADMIN — IOPAMIDOL 70 ML: 755 INJECTION, SOLUTION INTRAVENOUS at 12:59

## 2018-09-20 RX ADMIN — SODIUM CHLORIDE, PRESERVATIVE FREE 100 ML: 5 INJECTION INTRAVENOUS at 12:59

## 2019-09-13 ENCOUNTER — HOSPITAL ENCOUNTER (OUTPATIENT)
Dept: ULTRASOUND IMAGING | Facility: CLINIC | Age: 84
Discharge: HOME | End: 2019-09-13
Attending: PSYCHIATRY & NEUROLOGY | Admitting: PSYCHIATRY & NEUROLOGY
Payer: MEDICARE

## 2019-09-13 DIAGNOSIS — R26.9 GAIT DISORDER: ICD-10-CM

## 2019-09-13 DIAGNOSIS — R51.9 HEADACHE: ICD-10-CM

## 2019-09-13 DIAGNOSIS — I65.21 STENOSIS OF RIGHT CAROTID ARTERY: ICD-10-CM

## 2019-09-13 PROCEDURE — 93880 EXTRACRANIAL BILAT STUDY: CPT

## 2019-09-23 ENCOUNTER — APPOINTMENT (OUTPATIENT)
Dept: GENERAL RADIOLOGY | Facility: CLINIC | Age: 84
End: 2019-09-23
Attending: EMERGENCY MEDICINE
Payer: MEDICARE

## 2019-09-23 ENCOUNTER — HOSPITAL ENCOUNTER (EMERGENCY)
Facility: CLINIC | Age: 84
Discharge: HOME | End: 2019-09-23
Attending: EMERGENCY MEDICINE | Admitting: EMERGENCY MEDICINE
Payer: MEDICARE

## 2019-09-23 VITALS
HEART RATE: 66 BPM | BODY MASS INDEX: 18.58 KG/M2 | WEIGHT: 101 LBS | RESPIRATION RATE: 16 BRPM | TEMPERATURE: 98.6 F | HEIGHT: 62 IN | SYSTOLIC BLOOD PRESSURE: 160 MMHG | DIASTOLIC BLOOD PRESSURE: 88 MMHG | OXYGEN SATURATION: 98 %

## 2019-09-23 DIAGNOSIS — M54.9 UPPER BACK PAIN ON RIGHT SIDE: ICD-10-CM

## 2019-09-23 DIAGNOSIS — I10 HYPERTENSION, UNSPECIFIED TYPE: ICD-10-CM

## 2019-09-23 DIAGNOSIS — R11.0 NAUSEA: ICD-10-CM

## 2019-09-23 LAB
ALBUMIN SERPL-MCNC: 3.8 G/DL (ref 3.4–5)
ALP SERPL-CCNC: 67 U/L (ref 40–150)
ALT SERPL W P-5'-P-CCNC: 23 U/L (ref 0–50)
ANION GAP SERPL CALCULATED.3IONS-SCNC: 6 MMOL/L (ref 3–14)
AST SERPL W P-5'-P-CCNC: 25 U/L (ref 0–45)
BASOPHILS # BLD AUTO: 0.1 10E9/L (ref 0–0.2)
BASOPHILS NFR BLD AUTO: 0.6 %
BILIRUB SERPL-MCNC: 0.5 MG/DL (ref 0.2–1.3)
BUN SERPL-MCNC: 35 MG/DL (ref 7–30)
CALCIUM SERPL-MCNC: 9.3 MG/DL (ref 8.5–10.1)
CHLORIDE SERPL-SCNC: 105 MMOL/L (ref 94–109)
CO2 SERPL-SCNC: 26 MMOL/L (ref 20–32)
CREAT SERPL-MCNC: 1.04 MG/DL (ref 0.52–1.04)
DIFFERENTIAL METHOD BLD: NORMAL
EOSINOPHIL # BLD AUTO: 0.4 10E9/L (ref 0–0.7)
EOSINOPHIL NFR BLD AUTO: 4.1 %
ERYTHROCYTE [DISTWIDTH] IN BLOOD BY AUTOMATED COUNT: 12.1 % (ref 10–15)
GFR SERPL CREATININE-BSD FRML MDRD: 48 ML/MIN/{1.73_M2}
GLUCOSE SERPL-MCNC: 135 MG/DL (ref 70–99)
HCT VFR BLD AUTO: 39.7 % (ref 35–47)
HGB BLD-MCNC: 13.7 G/DL (ref 11.7–15.7)
IMM GRANULOCYTES # BLD: 0 10E9/L (ref 0–0.4)
IMM GRANULOCYTES NFR BLD: 0.5 %
INTERPRETATION ECG - MUSE: NORMAL
LYMPHOCYTES # BLD AUTO: 2.3 10E9/L (ref 0.8–5.3)
LYMPHOCYTES NFR BLD AUTO: 26.3 %
MCH RBC QN AUTO: 33 PG (ref 26.5–33)
MCHC RBC AUTO-ENTMCNC: 34.5 G/DL (ref 31.5–36.5)
MCV RBC AUTO: 96 FL (ref 78–100)
MONOCYTES # BLD AUTO: 0.8 10E9/L (ref 0–1.3)
MONOCYTES NFR BLD AUTO: 9.3 %
NEUTROPHILS # BLD AUTO: 5.2 10E9/L (ref 1.6–8.3)
NEUTROPHILS NFR BLD AUTO: 59.2 %
NRBC # BLD AUTO: 0 10*3/UL
NRBC BLD AUTO-RTO: 0 /100
PLATELET # BLD AUTO: 233 10E9/L (ref 150–450)
POTASSIUM SERPL-SCNC: 3.9 MMOL/L (ref 3.4–5.3)
PROT SERPL-MCNC: 7 G/DL (ref 6.8–8.8)
RBC # BLD AUTO: 4.15 10E12/L (ref 3.8–5.2)
SODIUM SERPL-SCNC: 137 MMOL/L (ref 133–144)
TROPONIN I SERPL-MCNC: <0.015 UG/L (ref 0–0.04)
TROPONIN I SERPL-MCNC: <0.015 UG/L (ref 0–0.04)
WBC # BLD AUTO: 8.7 10E9/L (ref 4–11)

## 2019-09-23 PROCEDURE — 85025 COMPLETE CBC W/AUTO DIFF WBC: CPT | Performed by: EMERGENCY MEDICINE

## 2019-09-23 PROCEDURE — 99285 EMERGENCY DEPT VISIT HI MDM: CPT | Mod: 25

## 2019-09-23 PROCEDURE — 25000128 H RX IP 250 OP 636: Performed by: EMERGENCY MEDICINE

## 2019-09-23 PROCEDURE — 96374 THER/PROPH/DIAG INJ IV PUSH: CPT

## 2019-09-23 PROCEDURE — 96361 HYDRATE IV INFUSION ADD-ON: CPT

## 2019-09-23 PROCEDURE — 25000132 ZZH RX MED GY IP 250 OP 250 PS 637: Mod: GY | Performed by: EMERGENCY MEDICINE

## 2019-09-23 PROCEDURE — 80053 COMPREHEN METABOLIC PANEL: CPT | Performed by: EMERGENCY MEDICINE

## 2019-09-23 PROCEDURE — 84484 ASSAY OF TROPONIN QUANT: CPT | Mod: 91 | Performed by: EMERGENCY MEDICINE

## 2019-09-23 PROCEDURE — 93005 ELECTROCARDIOGRAM TRACING: CPT

## 2019-09-23 PROCEDURE — 71046 X-RAY EXAM CHEST 2 VIEWS: CPT

## 2019-09-23 RX ORDER — ONDANSETRON 2 MG/ML
4 INJECTION INTRAMUSCULAR; INTRAVENOUS EVERY 30 MIN PRN
Status: DISCONTINUED | OUTPATIENT
Start: 2019-09-23 | End: 2019-09-23 | Stop reason: HOSPADM

## 2019-09-23 RX ORDER — ASPIRIN 81 MG/1
162 TABLET, CHEWABLE ORAL ONCE
Status: DISCONTINUED | OUTPATIENT
Start: 2019-09-23 | End: 2019-09-23

## 2019-09-23 RX ORDER — ONDANSETRON 4 MG/1
4 TABLET, ORALLY DISINTEGRATING ORAL EVERY 8 HOURS PRN
Qty: 10 TABLET | Refills: 0 | Status: SHIPPED | OUTPATIENT
Start: 2019-09-23 | End: 2022-07-15

## 2019-09-23 RX ORDER — LIDOCAINE 4 G/G
1 PATCH TOPICAL ONCE
Status: DISCONTINUED | OUTPATIENT
Start: 2019-09-23 | End: 2019-09-23 | Stop reason: HOSPADM

## 2019-09-23 RX ADMIN — SODIUM CHLORIDE 500 ML: 9 INJECTION, SOLUTION INTRAVENOUS at 04:22

## 2019-09-23 RX ADMIN — LIDOCAINE 1 PATCH: 560 PATCH PERCUTANEOUS; TOPICAL; TRANSDERMAL at 03:54

## 2019-09-23 RX ADMIN — ONDANSETRON 4 MG: 2 INJECTION INTRAMUSCULAR; INTRAVENOUS at 03:53

## 2019-09-23 ASSESSMENT — ENCOUNTER SYMPTOMS
DIAPHORESIS: 0
SHORTNESS OF BREATH: 0
ARTHRALGIAS: 1
CONFUSION: 0
WEAKNESS: 1
FATIGUE: 1
HEADACHES: 0
APPETITE CHANGE: 1
ABDOMINAL PAIN: 0

## 2019-09-23 ASSESSMENT — MIFFLIN-ST. JEOR: SCORE: 846.38

## 2019-09-23 NOTE — ED PROVIDER NOTES
"  History     Chief Complaint:  ***  Shoulder Pain      HPI   Susy Chavarria is a 87 year old female who presents with ***    Allergies:  The patient has no known drug allergies.    Medications:    Aspirin 81 mg PO  Atenolol  Hydrochlorothiazide  Pravachol     Past Medical History:    HLD  HTN    Past Surgical History:    Bilateral cataract removal  ENT surgery  Joint replacement x2  Nephrectomy  Release carpal tunnel right wrist    Family History:    Father: cancer    Social History:  Negative for tobacco use.  Positive for alcohol use.   Marital Status:        Review of Systems  ***    Physical Exam   First Vitals:  BP: (!) 149/99  Pulse: 61  Heart Rate: 61  Temp: 98.6  F (37  C)  Resp: 18  Height: 157.5 cm (5' 2\")  Weight: 45.8 kg (101 lb)  SpO2: 95 %      Physical Exam  ***    Emergency Department Course   ECG:  ***    Imaging:  {Radiographic findings?:837374220::\" \"}  ***    Laboratory:  ***    Procedures:  ***        Interventions:  ***  {Response to meds:229323::\" \"}    Emergency Department Course:  ***       Impression & Plan       {trauma activation?:231665::\" \"}  CMS Diagnoses: {Sepsis/Septic Shock/Stemi/Stroke:277262::\" \"}       Medical Decision Making:  ***  Critical Care time:  {none or minutes:251937::\"none\"}    Diagnosis:  No diagnosis found.    Disposition:  {discharged to home/discharged to home with.../Admitted to...:029654}    Discharge Medications:  New Prescriptions    No medications on file        Mir Rey  9/23/2019    EMERGENCY DEPARTMENT    "

## 2019-09-23 NOTE — ED PROVIDER NOTES
History     Chief Complaint:    Shoulder Pain      HPI   Susy Chavarria is a 87 year old female who presents to the ED via EMS for evaluation of shoulder pain. The patient states that she was laying in bed tonight around 2230 when she developed mild pain in her posterior right shoulder and neck. She notes that she has a history of chronic pain in this shoulder secondary to a torn rotator cuff, though her current symptoms feel different than her chronic pain. She states that her pain is worse with bending and stretching and radiates into her right arm. She called EMS who administed 324 mg of Aspirin. She also notes that she has felt more weak and fatigued throughout this past week intermittently along with mild nausea and decreased appetite throughout most of the day. She reports a chronic cough secondary to postnasal drip. She otherwise denies any chest pain, headache, blurred vision, confusion, difficulty balancing/walking, shortness of breath, pleuritic pain, diaphoresis, or abdominal pain. She also expressed some concern about having an elevated blood pressure earlier in the day when she was feeling unwell.      Allergies:  The patient has no known drug allergies.     Medications:    Aspirin 81 mg PO  Lisinopril  Metoprolol  Hydrochlorothiazide  Pravachol      Past Medical History:    HLD  HTN  Tricuspid regurgitation  Carotid artery stenosis     Past Surgical History:    Bilateral cataract removal  ENT surgery  Joint replacement x2  Nephrectomy  Release carpal tunnel right wrist     Family History:    Father: cancer     Social History:  Negative for tobacco use.  Positive for alcohol use.   Exercises regularly.   Marital Status:         Review of Systems   Constitutional: Positive for appetite change and fatigue. Negative for diaphoresis.   Respiratory: Negative for shortness of breath.    Cardiovascular: Negative for chest pain.   Gastrointestinal: Negative for abdominal pain.   Musculoskeletal:  "Positive for arthralgias.   Neurological: Positive for weakness. Negative for headaches.   Psychiatric/Behavioral: Negative for confusion.   All other systems reviewed and are negative.        Physical Exam   First Vitals:  BP: (!) 149/99  Pulse: 61  Heart Rate: 61  Temp: 98.6  F (37  C)  Resp: 18  Height: 157.5 cm (5' 2\")  Weight: 45.8 kg (101 lb)  SpO2: 95 %      Physical Exam  Constitutional:  Appears well-developed and well-nourished. Cooperative.   HENT:   Head:    Atraumatic.   Mouth/Throat:   Oropharynx is without erythema or exudate and mucous     membranes are moist.   Eyes:    Conjunctivae normal and EOM are normal.      Pupils are equal, round, and reactive to light.   Neck:    Normal range of motion. Neck supple.   Cardiovascular:  Normal rate, regular rhythm, normal heart sounds and radial and    dorsalis pedis pulses are 2+ and symmetric.    Pulmonary/Chest:  Effort normal and breath sounds normal.   Abdominal:   Soft. Bowel sounds are normal.      No splenomegaly or hepatomegaly. No tenderness. No rebound.   Musculoskeletal:  Normal range of motion. No edema and no tenderness.   Neurological:  Alert. Normal strength. No cranial nerve deficit.   Skin:    Skin is warm and dry.   Psychiatric:   Normal mood and affect.     Emergency Department Course   ECG:  Indication: CV screen  Time: 0314  Vent. Rate 60 bpm. MA interval 186. QRS duration 74. QT/QTc 410/410. P-R-T axis 66 47 64.  Normal sinus rhythm. Normal ECG. No longer at criteria for anterior infarct compared to 4/15/17. Read time: 0322    Imaging:  Radiographic findings were communicated with the patient who voiced understanding of the findings.  XR Chest 2 views:   IMPRESSION: No acute abnormality as per radiology.    Laboratory:  CBC: WBC: 8.7, HGB: 13.7, PLT: 233  CMP: Glucose 135 (H), BUN: 35 (H), GFR estimate: 48 (L) o/w WNL     0314 Troponin: <0.015  0521 Troponin: <0.015    Interventions:  0343 Zofran 4 mg IV  0354 Lidocaine 1 patch " transdermal  0422  mL IV    Emergency Department Course:  Nursing notes and vitals reviewed. (0333) I performed an exam of the patient as documented above.     IV inserted. Medicine administered as documented above. Blood drawn. This was sent to the lab for further testing, results above.     The patient was sent for a chest x-ray while in the emergency department, findings above.     0517 I rechecked the patient and discussed the results of her workup thus far.     0623 I rechecked the patient and discussed the results of her workup as well as her concerns about her blood pressure.     Findings and plan explained to the Patient. Patient discharged home with instructions regarding supportive care, medications, and reasons to return. The importance of close follow-up was reviewed. The patient was prescribed Zofran.     I personally reviewed the laboratory results with the Patient and answered all related questions prior to discharge.   Impression & Plan    Medical Decision Making:  Susy Chavarria is a 87 year old female who presents for evaluation of elevated blood pressure.  There is a history of hypertension in the past.  The workup here is negative and the patient does not have any clinical, laboratory, ecg or historical signs of end-organ dysfunction.  There is no signs of hypertensive emergency or urgency. Given data obtained here in ED, encouraged serial blood pressure monitoring at home to aid primary in decision making regarding hypertension.      She had some right posterior shoulder pain that radiates into her right arm.  Initially I consider this as an anginal equivalent.  Also there is no sign of infection. She has normal ROM of the shoulder. There has been no recent traumatic injury. She does have some minimal tenderness and palpable tight muscle on her right upper trapezius. I suspect this is the source of her pain. Lidoderm patch was applied. She is not certain if this, or simply time has  improved her pain. I don't suspect that this is referred pain from her right upper abdomen.  She has history of episodes of right shoulder pain associated with a rotator cuff injury from a few years ago.  This pain is intermittent.  Chest x-ray looks normal. I don't think there is further role for emergent testing. I have asked her to follow up with her primary care.    The patient also said that for the past week she has felt some malaise, nausea, and decreased appetite. Abdominal exam is benign. Electrolytes are normal.  Clinically she looks mildly dehydrated, and says her appetite has been diminished associated with her nausea.  She got 1/2 L of fluid here.  She is prescribed some Zofran for symptoms and she will discuss the nausea with her doctor if this is not promptly resolving.       Diagnosis:    ICD-10-CM    1. Upper back pain on right side M54.9    2. Nausea R11.0    3. Hypertension, unspecified type I10        Disposition:  discharged to home    Discharge Medications:  New Prescriptions    ONDANSETRON (ZOFRAN ODT) 4 MG ODT TAB    Take 1 tablet (4 mg) by mouth every 8 hours as needed     Scribe Disclosure:  I,  Mir Rey, am serving as a scribe on 9/23/2019 at 3:35 AM to personally document services performed by Ector Cueva MD based on my observations and the provider's statements to me.      Mir Rey  9/23/2019    EMERGENCY DEPARTMENT       Ector Cueva MD  09/23/19 [id number]

## 2019-09-23 NOTE — ED AVS SNAPSHOT
Emergency Department  [address] [place] [id number]  Phone:  [phone]  Fax:  [phone]                                    Susy Chavarria   MRN: [id number]    Department:   Emergency Department   Date of Visit:  9/23/2019           After Visit Summary Signature Page    I have received my discharge instructions, and my questions have been answered. I have discussed any challenges I see with this plan with the nurse or doctor.    ..........................................................................................................................................  Patient/Patient Representative Signature      ..........................................................................................................................................  Patient Representative Print Name and Relationship to Patient    ..................................................               ................................................  Date                                   Time    ..........................................................................................................................................  Reviewed by Signature/Title    ...................................................              ..............................................  Date                                               Time          22EPIC Rev 08/18

## 2019-09-23 NOTE — ED NOTES
Bed: ED26  Expected date: 9/23/19  Expected time: 3:00 AM  Means of arrival: Ambulance  Comments:  HEMS 429 87F R shoulder/hip pain

## 2019-09-23 NOTE — ED TRIAGE NOTES
Pt here from home by EMS for R shoulder pain. Pt states she was lying in bed going to sleep when she developed R shoulder pain that radiates to her R neck. She states she has a Hx of chronic pain in that shoulder but tonight this feels different. She states bending and stretching makes it worse. Denies recent trauma. EKG and Trop ordered. Received 324 ASA from EMS.

## 2021-03-15 ENCOUNTER — HOSPITAL ENCOUNTER (EMERGENCY)
Facility: CLINIC | Age: 86
Discharge: HOME | End: 2021-03-15
Attending: EMERGENCY MEDICINE | Admitting: EMERGENCY MEDICINE
Payer: MEDICARE

## 2021-03-15 VITALS
TEMPERATURE: 97.9 F | DIASTOLIC BLOOD PRESSURE: 83 MMHG | HEART RATE: 57 BPM | RESPIRATION RATE: 16 BRPM | SYSTOLIC BLOOD PRESSURE: 163 MMHG | OXYGEN SATURATION: 97 %

## 2021-03-15 DIAGNOSIS — N18.31 STAGE 3A CHRONIC KIDNEY DISEASE (H): ICD-10-CM

## 2021-03-15 DIAGNOSIS — I10 HYPERTENSION, UNSPECIFIED TYPE: ICD-10-CM

## 2021-03-15 LAB
ALBUMIN UR-MCNC: NEGATIVE MG/DL
ANION GAP SERPL CALCULATED.3IONS-SCNC: 5 MMOL/L (ref 3–14)
APPEARANCE UR: CLEAR
BASOPHILS # BLD AUTO: 0.1 10E9/L (ref 0–0.2)
BASOPHILS NFR BLD AUTO: 0.8 %
BILIRUB UR QL STRIP: NEGATIVE
BUN SERPL-MCNC: 31 MG/DL (ref 7–30)
CALCIUM SERPL-MCNC: 9.9 MG/DL (ref 8.5–10.1)
CHLORIDE SERPL-SCNC: 107 MMOL/L (ref 94–109)
CO2 SERPL-SCNC: 26 MMOL/L (ref 20–32)
COLOR UR AUTO: NORMAL
CREAT SERPL-MCNC: 0.96 MG/DL (ref 0.52–1.04)
DIFFERENTIAL METHOD BLD: NORMAL
EOSINOPHIL # BLD AUTO: 0.2 10E9/L (ref 0–0.7)
EOSINOPHIL NFR BLD AUTO: 2.5 %
ERYTHROCYTE [DISTWIDTH] IN BLOOD BY AUTOMATED COUNT: 12 % (ref 10–15)
GFR SERPL CREATININE-BSD FRML MDRD: 52 ML/MIN/{1.73_M2}
GLUCOSE SERPL-MCNC: 108 MG/DL (ref 70–99)
GLUCOSE UR STRIP-MCNC: NEGATIVE MG/DL
HCT VFR BLD AUTO: 40.4 % (ref 35–47)
HGB BLD-MCNC: 13.5 G/DL (ref 11.7–15.7)
HGB UR QL STRIP: NEGATIVE
IMM GRANULOCYTES # BLD: 0 10E9/L (ref 0–0.4)
IMM GRANULOCYTES NFR BLD: 0.5 %
KETONES UR STRIP-MCNC: NEGATIVE MG/DL
LEUKOCYTE ESTERASE UR QL STRIP: NEGATIVE
LYMPHOCYTES # BLD AUTO: 1.7 10E9/L (ref 0.8–5.3)
LYMPHOCYTES NFR BLD AUTO: 21.5 %
MCH RBC QN AUTO: 32.3 PG (ref 26.5–33)
MCHC RBC AUTO-ENTMCNC: 33.4 G/DL (ref 31.5–36.5)
MCV RBC AUTO: 97 FL (ref 78–100)
MONOCYTES # BLD AUTO: 0.7 10E9/L (ref 0–1.3)
MONOCYTES NFR BLD AUTO: 9.3 %
NEUTROPHILS # BLD AUTO: 5 10E9/L (ref 1.6–8.3)
NEUTROPHILS NFR BLD AUTO: 65.4 %
NITRATE UR QL: NEGATIVE
NRBC # BLD AUTO: 0 10*3/UL
NRBC BLD AUTO-RTO: 0 /100
PH UR STRIP: 7 PH (ref 5–7)
PLATELET # BLD AUTO: 245 10E9/L (ref 150–450)
POTASSIUM SERPL-SCNC: 4.6 MMOL/L (ref 3.4–5.3)
RBC # BLD AUTO: 4.18 10E12/L (ref 3.8–5.2)
SODIUM SERPL-SCNC: 138 MMOL/L (ref 133–144)
SOURCE: NORMAL
SP GR UR STRIP: 1.01 (ref 1–1.03)
UROBILINOGEN UR STRIP-MCNC: 0 MG/DL (ref 0–2)
WBC # BLD AUTO: 7.7 10E9/L (ref 4–11)

## 2021-03-15 PROCEDURE — 93005 ELECTROCARDIOGRAM TRACING: CPT

## 2021-03-15 PROCEDURE — 99284 EMERGENCY DEPT VISIT MOD MDM: CPT

## 2021-03-15 PROCEDURE — 80048 BASIC METABOLIC PNL TOTAL CA: CPT | Performed by: EMERGENCY MEDICINE

## 2021-03-15 PROCEDURE — 81003 URINALYSIS AUTO W/O SCOPE: CPT | Performed by: EMERGENCY MEDICINE

## 2021-03-15 PROCEDURE — 85025 COMPLETE CBC W/AUTO DIFF WBC: CPT | Performed by: EMERGENCY MEDICINE

## 2021-03-15 ASSESSMENT — ENCOUNTER SYMPTOMS
HEADACHES: 1
ABDOMINAL PAIN: 0
SHORTNESS OF BREATH: 0

## 2021-03-15 NOTE — ED PROVIDER NOTES
History   Chief Complaint:  Hypertension    HPI   Susy Chavarria is a 89 year old female, with a history of hypertension, who presents to the ED for evaluation of hypertension. The patient reports she woke up this morning and when she checked her blood pressure at 1030 she found her systolic pressure to be 203. She then noted she laid down and checked it again with the systolic pressure being 198, which after the second check she decided to come in for evaluation. The patient says she had some minor tingling sensations in the tips of her fingers, but she did not have any chest pain, shortness of breath, or abdominal pain. She conveys she has a left sided dull headache, which has improved since she arrived at the hospital. She has not altered her diet and has a normal appetite. She denies visual acuity changes. She has not missed any medication doses. She denies any other acute symptoms or complaints at this time.     Review of Systems   Eyes: Negative for visual disturbance.   Respiratory: Negative for shortness of breath.    Cardiovascular: Negative for chest pain and leg swelling.   Gastrointestinal: Negative for abdominal pain.   Neurological: Positive for headaches.   All other systems reviewed and are negative.    Allergies:  No known drug allergies    Medications:    Metoprolol  Omeprazole  Pravastatin    Past Medical History:    Hypertension  Esophogeal reflux  Arthritis  Hyperlipidemia    Past Surgical History:    Bilateral cataract removal  Right wrist carpal tunnel release  Right nephrectomy, donor  Right hip arthroplasty  Left knee arthroplasty    Family History:    Heart disease  Lymphoma  MI  Colon cancer  Diabetes    Social History:  PCP: Inez Gonzales MD  Presents to the ED with son    Physical Exam     Patient Vitals for the past 24 hrs:   BP Temp Temp src Pulse Resp SpO2   03/15/21 1315 (!) 159/73 -- -- 56 -- --   03/15/21 1300 (!) 152/79 -- -- -- -- --   03/15/21 1245 (!) 155/78 -- -- 60  -- --   03/15/21 1231 (!) 170/81 -- -- 61 -- --   03/15/21 1230 (!) 139/108 -- -- -- -- --   03/15/21 1204 (!) 173/74 97.9  F (36.6  C) Oral 69 16 97 %       Physical Exam  General: Alert, interactive   Head:  Scalp is atraumatic  Eyes:  The pupils are equal, round, and reactive to light    EOM's intact    No scleral icterus  ENT:      Nose:  The external nose is normal  Ears:  External ears are normal  Mouth/Throat: The oropharynx is normal    Mucus membranes are moist      Neck:  Normal range of motion.      There is no rigidity.    Trachea is in the midline         CV:  Regular rate and rhythm    No murmur   Resp:  Breath sounds are clear bilaterally    Non-labored, no retractions or accessory muscle use      GI:  Abdomen is soft, no distension, no tenderness.       MS:  Normal strength in all 4 extremities. No peripheral edema.  Skin:  Warm and dry, No rash or lesions noted.  Neuro: Strength 5/5 x4.  Sensation intact  In all 4 extremities.       Cranial nerves 2-12 grossly intact.    GCS: 15  Psych:  Awake. Alert.  Normal affect.      Appropriate interactions.    Emergency Department Course   ECG (12:31:10):  Rate 55 bpm. IN interval 182. QRS duration 68. QT/QTc 408/390. P-R-T axes 60 -7 49. Sinus bradycardia. Septal infarct, age undetermined. Abnormal ECG. No significant change compared to EKG on 9/23/19. Interpreted at 1239 by TriggerPrasanth MD.    Laboratory:  CBC: WNL (WBC 7.7, HGB 13.5, )    BMP:  (H), BUN 31 (H), GFR 52 (L), o/w WNL (Creatinine 0.96)    UA: Negative    Emergency Department Course:    Reviewed:  I reviewed the patient's nursing notes, vitals, past available medical records.     Assessments:  1212: I obtained history and examined the patient as noted above.     1320: I rechecked the patient and explained findings. All questions were answered.    Disposition:  The patient was discharged to home.    Impression & Plan    Medical Decision Making:  Susy Chavarria is an 89  year old female who was seen and evaluated. She was concerned for elevated blood pressure at home. Here, her blood pressure has trended down nicely to the 170s and she is asymptomatic. Laboratory work up is reassuring. EKG shows no signs of acute ischemia. She is feeling well and I believe she can be safely discharged home. She should closely follow up with her primary care provider to discuss any alterations in her antihypertensive regiment. There are no signs of acute intracranial hemorrhage, end organ damage, acute coronary syndrome, or more concerning illness.     Diagnosis:    ICD-10-CM    1. Hypertension, unspecified type  I10    2. Stage 3a chronic kidney disease  N18.31      Scribe Disclosure:  I, Roger Nina, am serving as a scribe at 12:12 PM on 3/15/2021 to document services personally performed by Prasanth Villalta MD based on my observations and the provider's statements to me.      Prasanth Villalta MD  03/15/21 [id number]

## 2021-03-15 NOTE — DISCHARGE INSTRUCTIONS
Discharge Instructions  Hypertension - High Blood Pressure    During you visit to the Emergency Department, your blood pressure was higher than the recommended blood pressure.  This may be related to stress, pain, medication or other temporary conditions. In these cases, your blood pressure may return to normal on its own. If you have a history of high blood pressure, you may need to have your provider adjust your medications. Sometimes, your high measurement here may indicate that you have developed high blood pressure that will stay high unless it is treated. As a general rule, high blood pressure causes problems over years rather than days, weeks, or months. So, while it is important to treat blood pressure, it is rarely important to treat blood pressure immediately. Occasionally we will begin a medication in the Emergency Department; more often we will recommend close follow-up for medications with a primary doctor/clinic.    Generally, every Emergency Department visit should have a follow-up clinic visit with either a primary or a specialty clinic/provider. Please follow-up as instructed by your emergency provider today.    Return to the Emergency Department if you start to have:  A severe headache.  Chest pain.  Shortness of breath.  Weakness or numbness that affects one part of the body.  Confusion.  Vision changes.  Significant swelling of legs and/or eyes.  A reaction to any medication started in the Emergency Department.    What can I do to help myself?  Avoid alcohol.  Take any blood pressure medicine that you are prescribed.  Get a good night s sleep.  Lower your salt intake.  Exercise.  Lose weight.  Manage stress.  See your doctor regularly    If blood pressure medication was started in the Emergency Department:  The medicine may not have an immediate effect. The body and brain determine what blood pressure you have. The medicine s job is to retrain the body s  thermostat  to a lower blood  pressure.  You will need to follow up with your provider to see how this medicine is working for you.  If you were given a prescription for medicine here today, be sure to read all of the information (including the package insert) that comes with your prescription.  This will include important information about the medicine, its side effects, and any warnings that you need to know about.  The pharmacist who fills the prescription can provide more information and answer questions you may have about the medicine.  If you have questions or concerns that the pharmacist cannot address, please call or return to the Emergency Department.   Remember that you can always come back to the Emergency Department if you are not able to see your regular provider in the amount of time listed above, if you get any new symptoms, or if there is anything that worries you.

## 2021-03-16 LAB — INTERPRETATION ECG - MUSE: NORMAL

## 2022-05-31 ENCOUNTER — TRANSFERRED RECORDS (OUTPATIENT)
Dept: HEALTH INFORMATION MANAGEMENT | Facility: CLINIC | Age: 87
End: 2022-05-31
Payer: MEDICARE

## 2022-06-29 NOTE — PLAN OF CARE
Your Elective Patient:    Patient Name:  Susy Chavarria    : 1932    MRN: [id number]    Surgeon: Dr Fortino Zapien    DOS/Procedure:  2022 - [facility]    Hospital: [facility]    Insurance Medicare [facility]    PCP- Kulwinder Gonzales         Assessment Findings: Will update with additional concerns from the H&P as indicated  Medical              BMI = 17.57              Covid Status-Yes              Age 89 (2pt)              HTN - 3 med (1pt)              Functional              Currently Ambulation - cane for ambulation              Met score -No shortness of breath              Falls - o falls         Living Situation              Stairs to enter home -   No stairs - AL [facility] haro Mann              Bathroom setup - Handicap - grab bars and call system- walk-in shower              Post Op Support/Resources              Support - AL - currently no services -cooks herself - meals are available              Transportation - daughter will provide              Equipment available - Walker, shower bench    Discharge Disposition              TCU Stay    1.     [facility] - sent referral    2.     Teodoro Mann    3.     Julieta Brewer    4.     Davey Chandler

## 2022-07-13 NOTE — PROGRESS NOTES
Pre-op Total Joint Patient Screening    1. Do you have a ride available to come to the hospital the day after your surgery by 8am with anticipated discharge of 11am? N/A  2. What is the name of this person? N/A  3. Do you have a  set up after surgery? N/A  4. Will your  be the same person that gives you a ride home after surgery? N/A  5. Have you received the Joint Replacement Guidebook? Y  6. Do you have any questions about your guidebook? N  7. Have you activated your Client[employer] account? N  8. Have you signed up for MY Chart access? N     Pt is currently residing at [facility].   She would like to go to a rehab/TCU unit for post-op home care.

## 2022-07-14 ENCOUNTER — LAB (OUTPATIENT)
Dept: URGENT CARE | Facility: URGENT CARE | Age: 87
End: 2022-07-14
Payer: MEDICARE

## 2022-07-14 DIAGNOSIS — Z20.822 ENCOUNTER FOR LABORATORY TESTING FOR COVID-19 VIRUS: ICD-10-CM

## 2022-07-14 PROCEDURE — U0003 INFECTIOUS AGENT DETECTION BY NUCLEIC ACID (DNA OR RNA); SEVERE ACUTE RESPIRATORY SYNDROME CORONAVIRUS 2 (SARS-COV-2) (CORONAVIRUS DISEASE [COVID-19]), AMPLIFIED PROBE TECHNIQUE, MAKING USE OF HIGH THROUGHPUT TECHNOLOGIES AS DESCRIBED BY CMS-2020-01-R: HCPCS

## 2022-07-14 PROCEDURE — U0005 INFEC AGEN DETEC AMPLI PROBE: HCPCS

## 2022-07-15 LAB — SARS-COV-2 RNA RESP QL NAA+PROBE: NEGATIVE

## 2022-07-15 RX ORDER — LISINOPRIL 2.5 MG/1
2.5 TABLET ORAL EVERY EVENING
COMMUNITY

## 2022-07-15 RX ORDER — SENNOSIDES 8.6 MG
650 CAPSULE ORAL PRN
Status: ON HOLD | COMMUNITY
End: 2023-10-04

## 2022-07-15 RX ORDER — ESCITALOPRAM OXALATE 5 MG/1
5 TABLET ORAL EVERY MORNING
COMMUNITY

## 2022-07-15 RX ORDER — BRIMONIDINE TARTRATE 2 MG/ML
1 SOLUTION/ DROPS OPHTHALMIC 2 TIMES DAILY
COMMUNITY

## 2022-07-15 RX ORDER — AMLODIPINE BESYLATE 2.5 MG/1
2.5 TABLET ORAL EVERY MORNING
COMMUNITY

## 2022-07-15 RX ORDER — DORZOLAMIDE HCL 20 MG/ML
1 SOLUTION/ DROPS OPHTHALMIC 2 TIMES DAILY
COMMUNITY

## 2022-07-15 RX ORDER — AMOXICILLIN 500 MG/1
2000 TABLET, FILM COATED ORAL ONCE
COMMUNITY

## 2022-07-15 NOTE — PROGRESS NOTES
PTA medications updated by Medication Scribe prior to surgery via phone call with patient (last doses completed by Nurse)     Medication history sources: Patient, Surescripts and H&P  In the past week, patient estimated taking medication this percent of the time: Greater than 90%  Adherence assessment: N/A Not Observed    Significant changes made to the medication list:  Patient reports no longer taking the following meds (med scribe removed from PTA med list): Atenolol, Hydrodiuril, MVI,Multivitamins-minerals, Zofran      Additional medication history information:   Patient was advised to bring: Systane eye drops, Brimonidine eye drops, Dorzolamide eye drops    Medication reconciliation completed by provider prior to medication history? No    Time spent in this activity: 60 minutes    The information provided in this note is only as accurate as the sources available at the time of update(s)      Prior to Admission medications    Medication Sig Last Dose Taking? Auth Provider Long Term End Date   acetaminophen (TYLENOL 8 HOUR ARTHRITIS PAIN) 650 MG CR tablet Take 650 mg by mouth as needed for mild pain or fever 7/14/2022 at PM Yes Reported, Patient     amLODIPine (NORVASC) 2.5 MG tablet Take 2.5 mg by mouth every morning  at AM Yes Reported, Patient Yes    amoxicillin (AMOXIL) 500 MG tablet Take 2,000 mg by mouth once (4 x 500 mg)  Prophylactic prior to dental appointment 3 months ago Yes Reported, Patient     aspirin 81 MG tablet Take 1 tablet by mouth daily with food 7/4/2022 at AM Yes Reported, Patient     brimonidine (ALPHAGAN) 0.2 % ophthalmic solution Place 1 drop into both eyes 2 times daily  at AM Yes Reported, Patient     Calcium Carb-Cholecalciferol (CALCIUM CARBONATE-VITAMIN D3) 600-400 MG-UNIT TABS Take 1 tablet by mouth every morning 7/9/2022 at AM Yes Reported, Patient     dorzolamide (TRUSOPT) 2 % ophthalmic solution Place 1 drop Into the left eye daily  at AM Yes Reported, Patient     escitalopram  (LEXAPRO) 5 MG tablet Take 5 mg by mouth every morning  at AM Yes Reported, Patient Yes    lisinopril (ZESTRIL) 2.5 MG tablet Take 2.5 mg by mouth every evening  at PM Yes Reported, Patient Yes    Multiple Vitamins-Minerals (MULTIVITAMIN ADULTS 50+ PO) Take 1 tablet by mouth daily  at AM Yes Reported, Patient No    omeprazole (PRILOSEC) 20 MG capsule Take 20 mg by mouth daily ( Before a meal)  at AM Yes Reported, Patient     polyethylene glycol-propylene glycol (SYSTANE ULTRA) 0.4-0.3 % SOLN ophthalmic solution Place 1 drop into both eyes 2 times daily as needed for dry eyes  at PRN Yes Reported, Patient     pravastatin (PRAVACHOL) 20 MG tablet Take 20 mg by mouth At Bedtime  at HS Yes Reported, Patient

## 2022-07-18 ENCOUNTER — ANESTHESIA (OUTPATIENT)
Dept: SURGERY | Facility: CLINIC | Age: 87
End: 2022-07-18
Payer: MEDICARE

## 2022-07-18 ENCOUNTER — HOSPITAL ENCOUNTER (OUTPATIENT)
Facility: CLINIC | Age: 87
Discharge: NURSING, REHAB OR LONG-TERM CARE | End: 2022-07-19
Attending: ORTHOPAEDIC SURGERY | Admitting: ORTHOPAEDIC SURGERY
Payer: MEDICARE

## 2022-07-18 ENCOUNTER — DOCUMENTATION ONLY (OUTPATIENT)
Dept: OTHER | Facility: CLINIC | Age: 87
End: 2022-07-18

## 2022-07-18 ENCOUNTER — ANESTHESIA EVENT (OUTPATIENT)
Dept: SURGERY | Facility: CLINIC | Age: 87
End: 2022-07-18
Payer: MEDICARE

## 2022-07-18 ENCOUNTER — APPOINTMENT (OUTPATIENT)
Dept: GENERAL RADIOLOGY | Facility: CLINIC | Age: 87
End: 2022-07-18
Attending: ORTHOPAEDIC SURGERY
Payer: MEDICARE

## 2022-07-18 ENCOUNTER — APPOINTMENT (OUTPATIENT)
Dept: PHYSICAL THERAPY | Facility: CLINIC | Age: 87
End: 2022-07-18
Attending: ORTHOPAEDIC SURGERY
Payer: MEDICARE

## 2022-07-18 DIAGNOSIS — Z96.641 HISTORY OF TOTAL HIP ARTHROPLASTY, RIGHT: ICD-10-CM

## 2022-07-18 DIAGNOSIS — Z96.642 H/O TOTAL HIP ARTHROPLASTY, LEFT: Primary | ICD-10-CM

## 2022-07-18 LAB
ABO/RH(D): NORMAL
ANTIBODY SCREEN: NEGATIVE
SPECIMEN EXPIRATION DATE: NORMAL

## 2022-07-18 PROCEDURE — 360N000077 HC SURGERY LEVEL 4, PER MIN: Performed by: ORTHOPAEDIC SURGERY

## 2022-07-18 PROCEDURE — 250N000011 HC RX IP 250 OP 636: Performed by: ORTHOPAEDIC SURGERY

## 2022-07-18 PROCEDURE — 99222 1ST HOSP IP/OBS MODERATE 55: CPT | Performed by: PHYSICIAN ASSISTANT

## 2022-07-18 PROCEDURE — 97116 GAIT TRAINING THERAPY: CPT | Mod: GP

## 2022-07-18 PROCEDURE — 999N000141 HC STATISTIC PRE-PROCEDURE NURSING ASSESSMENT: Performed by: ORTHOPAEDIC SURGERY

## 2022-07-18 PROCEDURE — 258N000003 HC RX IP 258 OP 636: Performed by: NURSE ANESTHETIST, CERTIFIED REGISTERED

## 2022-07-18 PROCEDURE — 250N000011 HC RX IP 250 OP 636

## 2022-07-18 PROCEDURE — 250N000013 HC RX MED GY IP 250 OP 250 PS 637: Performed by: ORTHOPAEDIC SURGERY

## 2022-07-18 PROCEDURE — 710N000009 HC RECOVERY PHASE 1, LEVEL 1, PER MIN: Performed by: ORTHOPAEDIC SURGERY

## 2022-07-18 PROCEDURE — 258N000003 HC RX IP 258 OP 636: Performed by: ANESTHESIOLOGY

## 2022-07-18 PROCEDURE — 258N000003 HC RX IP 258 OP 636: Performed by: ORTHOPAEDIC SURGERY

## 2022-07-18 PROCEDURE — 86850 RBC ANTIBODY SCREEN: CPT | Performed by: ORTHOPAEDIC SURGERY

## 2022-07-18 PROCEDURE — 250N000009 HC RX 250: Performed by: NURSE ANESTHETIST, CERTIFIED REGISTERED

## 2022-07-18 PROCEDURE — 370N000017 HC ANESTHESIA TECHNICAL FEE, PER MIN: Performed by: ORTHOPAEDIC SURGERY

## 2022-07-18 PROCEDURE — 250N000011 HC RX IP 250 OP 636: Performed by: NURSE ANESTHETIST, CERTIFIED REGISTERED

## 2022-07-18 PROCEDURE — C1713 ANCHOR/SCREW BN/BN,TIS/BN: HCPCS | Performed by: ORTHOPAEDIC SURGERY

## 2022-07-18 PROCEDURE — 97161 PT EVAL LOW COMPLEX 20 MIN: CPT | Mod: GP

## 2022-07-18 PROCEDURE — 250N000025 HC SEVOFLURANE, PER MIN: Performed by: ORTHOPAEDIC SURGERY

## 2022-07-18 PROCEDURE — 258N000001 HC RX 258: Performed by: ORTHOPAEDIC SURGERY

## 2022-07-18 PROCEDURE — 250N000009 HC RX 250: Performed by: ORTHOPAEDIC SURGERY

## 2022-07-18 PROCEDURE — C1776 JOINT DEVICE (IMPLANTABLE): HCPCS | Performed by: ORTHOPAEDIC SURGERY

## 2022-07-18 PROCEDURE — 258N000003 HC RX IP 258 OP 636

## 2022-07-18 PROCEDURE — 86901 BLOOD TYPING SEROLOGIC RH(D): CPT | Performed by: ORTHOPAEDIC SURGERY

## 2022-07-18 PROCEDURE — 36415 COLL VENOUS BLD VENIPUNCTURE: CPT | Performed by: ORTHOPAEDIC SURGERY

## 2022-07-18 PROCEDURE — 250N000011 HC RX IP 250 OP 636: Performed by: ANESTHESIOLOGY

## 2022-07-18 PROCEDURE — 97530 THERAPEUTIC ACTIVITIES: CPT | Mod: GP

## 2022-07-18 PROCEDURE — 272N000001 HC OR GENERAL SUPPLY STERILE: Performed by: ORTHOPAEDIC SURGERY

## 2022-07-18 PROCEDURE — 250N000009 HC RX 250

## 2022-07-18 PROCEDURE — 999N000063 XR PELVIS PORT 1/2 VIEWS

## 2022-07-18 DEVICE — PINNACLE HIP SOLUTIONS ALTRX POLYETHYLENE ACETABULAR LINER +4 NEUTRAL 32MM ID 50MM OD
Type: IMPLANTABLE DEVICE | Site: HIP | Status: FUNCTIONAL
Brand: PINNACLE ALTRX

## 2022-07-18 DEVICE — PINNACLE CANCELLOUS BONE SCREW 6.5MM X 30MM
Type: IMPLANTABLE DEVICE | Site: HIP | Status: FUNCTIONAL
Brand: PINNACLE

## 2022-07-18 DEVICE — PINNACLE POROCOAT ACETABULAR SHELL SECTOR II 50MM OD
Type: IMPLANTABLE DEVICE | Site: HIP | Status: FUNCTIONAL
Brand: PINNACLE POROCOAT

## 2022-07-18 DEVICE — SUMMIT FEMORAL STEM 12/14 TAPER TAPER ED W/POROCOAT SIZE 2 HI 130MM
Type: IMPLANTABLE DEVICE | Site: HIP | Status: FUNCTIONAL
Brand: SUMMIT POROCOAT

## 2022-07-18 DEVICE — ARTICUL/EZE FEMORAL HEAD DIAMETER 32MM +1 12/14 TAPER
Type: IMPLANTABLE DEVICE | Site: HIP | Status: FUNCTIONAL
Brand: ARTICUL/EZE

## 2022-07-18 RX ORDER — SENNOSIDES 8.6 MG
650 CAPSULE ORAL EVERY 8 HOURS SCHEDULED
Status: DISCONTINUED | OUTPATIENT
Start: 2022-07-18 | End: 2022-07-18

## 2022-07-18 RX ORDER — AMOXICILLIN 250 MG
1 CAPSULE ORAL 2 TIMES DAILY
Status: DISCONTINUED | OUTPATIENT
Start: 2022-07-18 | End: 2022-07-19 | Stop reason: HOSPADM

## 2022-07-18 RX ORDER — ONDANSETRON 4 MG/1
4 TABLET, ORALLY DISINTEGRATING ORAL EVERY 30 MIN PRN
Status: DISCONTINUED | OUTPATIENT
Start: 2022-07-18 | End: 2022-07-18 | Stop reason: HOSPADM

## 2022-07-18 RX ORDER — FENTANYL CITRATE 0.05 MG/ML
25 INJECTION, SOLUTION INTRAMUSCULAR; INTRAVENOUS EVERY 5 MIN PRN
Status: DISCONTINUED | OUTPATIENT
Start: 2022-07-18 | End: 2022-07-18 | Stop reason: HOSPADM

## 2022-07-18 RX ORDER — AMOXICILLIN 250 MG
1-2 CAPSULE ORAL 2 TIMES DAILY
Qty: 30 TABLET | Refills: 0 | Status: SHIPPED | OUTPATIENT
Start: 2022-07-18 | End: 2022-07-19

## 2022-07-18 RX ORDER — MAGNESIUM HYDROXIDE 1200 MG/15ML
LIQUID ORAL PRN
Status: DISCONTINUED | OUTPATIENT
Start: 2022-07-18 | End: 2022-07-18 | Stop reason: HOSPADM

## 2022-07-18 RX ORDER — ASPIRIN 81 MG/1
81 TABLET ORAL 2 TIMES DAILY
Qty: 60 TABLET | Refills: 0 | Status: SHIPPED | OUTPATIENT
Start: 2022-07-18 | End: 2022-07-19

## 2022-07-18 RX ORDER — ONDANSETRON 2 MG/ML
4 INJECTION INTRAMUSCULAR; INTRAVENOUS EVERY 30 MIN PRN
Status: DISCONTINUED | OUTPATIENT
Start: 2022-07-18 | End: 2022-07-18 | Stop reason: HOSPADM

## 2022-07-18 RX ORDER — AMLODIPINE BESYLATE 2.5 MG/1
2.5 TABLET ORAL EVERY MORNING
Status: DISCONTINUED | OUTPATIENT
Start: 2022-07-18 | End: 2022-07-19 | Stop reason: HOSPADM

## 2022-07-18 RX ORDER — TRANEXAMIC ACID 650 MG/1
1950 TABLET ORAL ONCE
Status: COMPLETED | OUTPATIENT
Start: 2022-07-18 | End: 2022-07-18

## 2022-07-18 RX ORDER — FENTANYL CITRATE 50 UG/ML
INJECTION, SOLUTION INTRAMUSCULAR; INTRAVENOUS PRN
Status: DISCONTINUED | OUTPATIENT
Start: 2022-07-18 | End: 2022-07-18

## 2022-07-18 RX ORDER — ACETAMINOPHEN 325 MG/1
975 TABLET ORAL EVERY 8 HOURS
Status: DISCONTINUED | OUTPATIENT
Start: 2022-07-18 | End: 2022-07-19 | Stop reason: HOSPADM

## 2022-07-18 RX ORDER — HYDROMORPHONE HCL IN WATER/PF 6 MG/30 ML
0.2 PATIENT CONTROLLED ANALGESIA SYRINGE INTRAVENOUS
Status: DISCONTINUED | OUTPATIENT
Start: 2022-07-18 | End: 2022-07-19 | Stop reason: HOSPADM

## 2022-07-18 RX ORDER — BISACODYL 10 MG
10 SUPPOSITORY, RECTAL RECTAL DAILY PRN
Status: DISCONTINUED | OUTPATIENT
Start: 2022-07-18 | End: 2022-07-19 | Stop reason: HOSPADM

## 2022-07-18 RX ORDER — HYDROXYZINE HYDROCHLORIDE 10 MG/1
10 TABLET, FILM COATED ORAL EVERY 6 HOURS PRN
Status: DISCONTINUED | OUTPATIENT
Start: 2022-07-18 | End: 2022-07-19 | Stop reason: HOSPADM

## 2022-07-18 RX ORDER — ACETAMINOPHEN 325 MG/1
650 TABLET ORAL EVERY 4 HOURS PRN
Qty: 100 TABLET | Refills: 0 | Status: SHIPPED | OUTPATIENT
Start: 2022-07-18 | End: 2022-07-19

## 2022-07-18 RX ORDER — LABETALOL HYDROCHLORIDE 5 MG/ML
10 INJECTION, SOLUTION INTRAVENOUS
Status: DISCONTINUED | OUTPATIENT
Start: 2022-07-18 | End: 2022-07-18 | Stop reason: HOSPADM

## 2022-07-18 RX ORDER — SODIUM CHLORIDE, SODIUM LACTATE, POTASSIUM CHLORIDE, CALCIUM CHLORIDE 600; 310; 30; 20 MG/100ML; MG/100ML; MG/100ML; MG/100ML
INJECTION, SOLUTION INTRAVENOUS CONTINUOUS
Status: DISCONTINUED | OUTPATIENT
Start: 2022-07-18 | End: 2022-07-18 | Stop reason: HOSPADM

## 2022-07-18 RX ORDER — ESCITALOPRAM OXALATE 5 MG/1
5 TABLET ORAL EVERY MORNING
Status: DISCONTINUED | OUTPATIENT
Start: 2022-07-19 | End: 2022-07-19 | Stop reason: HOSPADM

## 2022-07-18 RX ORDER — SODIUM CHLORIDE 9 MG/ML
INJECTION, SOLUTION INTRAVENOUS CONTINUOUS
Status: DISCONTINUED | OUTPATIENT
Start: 2022-07-18 | End: 2022-07-19 | Stop reason: HOSPADM

## 2022-07-18 RX ORDER — POLYETHYLENE GLYCOL 3350 17 G/17G
1 POWDER, FOR SOLUTION ORAL DAILY
Qty: 7 PACKET | Refills: 0 | Status: SHIPPED | OUTPATIENT
Start: 2022-07-18 | End: 2022-07-19

## 2022-07-18 RX ORDER — HYDROMORPHONE HCL IN WATER/PF 6 MG/30 ML
0.2 PATIENT CONTROLLED ANALGESIA SYRINGE INTRAVENOUS EVERY 5 MIN PRN
Status: DISCONTINUED | OUTPATIENT
Start: 2022-07-18 | End: 2022-07-18 | Stop reason: HOSPADM

## 2022-07-18 RX ORDER — ACETAMINOPHEN 325 MG/1
650 TABLET ORAL EVERY 4 HOURS PRN
Status: DISCONTINUED | OUTPATIENT
Start: 2022-07-21 | End: 2022-07-19 | Stop reason: HOSPADM

## 2022-07-18 RX ORDER — LIDOCAINE 40 MG/G
CREAM TOPICAL
Status: DISCONTINUED | OUTPATIENT
Start: 2022-07-18 | End: 2022-07-19 | Stop reason: HOSPADM

## 2022-07-18 RX ORDER — GLYCOPYRROLATE 0.2 MG/ML
INJECTION, SOLUTION INTRAMUSCULAR; INTRAVENOUS PRN
Status: DISCONTINUED | OUTPATIENT
Start: 2022-07-18 | End: 2022-07-18

## 2022-07-18 RX ORDER — NALOXONE HYDROCHLORIDE 0.4 MG/ML
0.4 INJECTION, SOLUTION INTRAMUSCULAR; INTRAVENOUS; SUBCUTANEOUS
Status: DISCONTINUED | OUTPATIENT
Start: 2022-07-18 | End: 2022-07-19 | Stop reason: HOSPADM

## 2022-07-18 RX ORDER — VANCOMYCIN HYDROCHLORIDE 1 G/20ML
INJECTION, POWDER, LYOPHILIZED, FOR SOLUTION INTRAVENOUS PRN
Status: DISCONTINUED | OUTPATIENT
Start: 2022-07-18 | End: 2022-07-18 | Stop reason: HOSPADM

## 2022-07-18 RX ORDER — ASPIRIN 81 MG/1
81 TABLET ORAL 2 TIMES DAILY
Status: DISCONTINUED | OUTPATIENT
Start: 2022-07-18 | End: 2022-07-19 | Stop reason: HOSPADM

## 2022-07-18 RX ORDER — NALOXONE HYDROCHLORIDE 0.4 MG/ML
0.2 INJECTION, SOLUTION INTRAMUSCULAR; INTRAVENOUS; SUBCUTANEOUS
Status: DISCONTINUED | OUTPATIENT
Start: 2022-07-18 | End: 2022-07-19 | Stop reason: HOSPADM

## 2022-07-18 RX ORDER — PRAVASTATIN SODIUM 20 MG
20 TABLET ORAL AT BEDTIME
Status: DISCONTINUED | OUTPATIENT
Start: 2022-07-18 | End: 2022-07-19 | Stop reason: HOSPADM

## 2022-07-18 RX ORDER — PROPOFOL 10 MG/ML
INJECTION, EMULSION INTRAVENOUS CONTINUOUS PRN
Status: DISCONTINUED | OUTPATIENT
Start: 2022-07-18 | End: 2022-07-18

## 2022-07-18 RX ORDER — ONDANSETRON 4 MG/1
4-8 TABLET, ORALLY DISINTEGRATING ORAL EVERY 8 HOURS PRN
Qty: 10 TABLET | Refills: 0 | Status: SHIPPED | OUTPATIENT
Start: 2022-07-18 | End: 2022-07-19

## 2022-07-18 RX ORDER — DIPHENHYDRAMINE HCL 12.5MG/5ML
12.5 LIQUID (ML) ORAL EVERY 6 HOURS PRN
Status: DISCONTINUED | OUTPATIENT
Start: 2022-07-18 | End: 2022-07-19 | Stop reason: HOSPADM

## 2022-07-18 RX ORDER — PANTOPRAZOLE SODIUM 20 MG/1
40 TABLET, DELAYED RELEASE ORAL DAILY
Status: DISCONTINUED | OUTPATIENT
Start: 2022-07-19 | End: 2022-07-19 | Stop reason: HOSPADM

## 2022-07-18 RX ORDER — ONDANSETRON 2 MG/ML
INJECTION INTRAMUSCULAR; INTRAVENOUS PRN
Status: DISCONTINUED | OUTPATIENT
Start: 2022-07-18 | End: 2022-07-18

## 2022-07-18 RX ORDER — ONDANSETRON 2 MG/ML
4 INJECTION INTRAMUSCULAR; INTRAVENOUS EVERY 6 HOURS PRN
Status: DISCONTINUED | OUTPATIENT
Start: 2022-07-18 | End: 2022-07-19 | Stop reason: HOSPADM

## 2022-07-18 RX ORDER — DORZOLAMIDE HCL 20 MG/ML
1 SOLUTION/ DROPS OPHTHALMIC DAILY
Status: DISCONTINUED | OUTPATIENT
Start: 2022-07-19 | End: 2022-07-19 | Stop reason: HOSPADM

## 2022-07-18 RX ORDER — HYDROXYZINE HYDROCHLORIDE 10 MG/1
10 TABLET, FILM COATED ORAL EVERY 6 HOURS PRN
Qty: 30 TABLET | Refills: 0 | Status: SHIPPED | OUTPATIENT
Start: 2022-07-18 | End: 2022-07-19

## 2022-07-18 RX ORDER — CEFAZOLIN SODIUM/WATER 2 G/20 ML
2 SYRINGE (ML) INTRAVENOUS SEE ADMIN INSTRUCTIONS
Status: DISCONTINUED | OUTPATIENT
Start: 2022-07-18 | End: 2022-07-18

## 2022-07-18 RX ORDER — HYDROMORPHONE HCL IN WATER/PF 6 MG/30 ML
0.4 PATIENT CONTROLLED ANALGESIA SYRINGE INTRAVENOUS
Status: DISCONTINUED | OUTPATIENT
Start: 2022-07-18 | End: 2022-07-19 | Stop reason: HOSPADM

## 2022-07-18 RX ORDER — PROCHLORPERAZINE MALEATE 5 MG
5 TABLET ORAL EVERY 6 HOURS PRN
Status: DISCONTINUED | OUTPATIENT
Start: 2022-07-18 | End: 2022-07-19 | Stop reason: HOSPADM

## 2022-07-18 RX ORDER — CEFAZOLIN SODIUM 1 G/3ML
1 INJECTION, POWDER, FOR SOLUTION INTRAMUSCULAR; INTRAVENOUS EVERY 8 HOURS
Status: COMPLETED | OUTPATIENT
Start: 2022-07-18 | End: 2022-07-18

## 2022-07-18 RX ORDER — TRAMADOL HYDROCHLORIDE 50 MG/1
50 TABLET ORAL EVERY 6 HOURS PRN
Status: DISCONTINUED | OUTPATIENT
Start: 2022-07-18 | End: 2022-07-19 | Stop reason: HOSPADM

## 2022-07-18 RX ORDER — CEFAZOLIN SODIUM/WATER 2 G/20 ML
2 SYRINGE (ML) INTRAVENOUS
Status: COMPLETED | OUTPATIENT
Start: 2022-07-18 | End: 2022-07-18

## 2022-07-18 RX ORDER — LIDOCAINE HYDROCHLORIDE 20 MG/ML
INJECTION, SOLUTION INFILTRATION; PERINEURAL PRN
Status: DISCONTINUED | OUTPATIENT
Start: 2022-07-18 | End: 2022-07-18

## 2022-07-18 RX ORDER — ONDANSETRON 4 MG/1
4 TABLET, ORALLY DISINTEGRATING ORAL EVERY 6 HOURS PRN
Status: DISCONTINUED | OUTPATIENT
Start: 2022-07-18 | End: 2022-07-19 | Stop reason: HOSPADM

## 2022-07-18 RX ORDER — HYDROMORPHONE HYDROCHLORIDE 1 MG/ML
INJECTION, SOLUTION INTRAMUSCULAR; INTRAVENOUS; SUBCUTANEOUS PRN
Status: DISCONTINUED | OUTPATIENT
Start: 2022-07-18 | End: 2022-07-18

## 2022-07-18 RX ORDER — DEXAMETHASONE SODIUM PHOSPHATE 4 MG/ML
INJECTION, SOLUTION INTRA-ARTICULAR; INTRALESIONAL; INTRAMUSCULAR; INTRAVENOUS; SOFT TISSUE PRN
Status: DISCONTINUED | OUTPATIENT
Start: 2022-07-18 | End: 2022-07-18

## 2022-07-18 RX ORDER — NEOSTIGMINE METHYLSULFATE 1 MG/ML
VIAL (ML) INJECTION PRN
Status: DISCONTINUED | OUTPATIENT
Start: 2022-07-18 | End: 2022-07-18

## 2022-07-18 RX ORDER — POLYETHYLENE GLYCOL 3350 17 G/17G
17 POWDER, FOR SOLUTION ORAL DAILY
Status: DISCONTINUED | OUTPATIENT
Start: 2022-07-19 | End: 2022-07-19 | Stop reason: HOSPADM

## 2022-07-18 RX ORDER — LISINOPRIL 2.5 MG/1
2.5 TABLET ORAL EVERY EVENING
Status: DISCONTINUED | OUTPATIENT
Start: 2022-07-18 | End: 2022-07-19 | Stop reason: HOSPADM

## 2022-07-18 RX ORDER — SODIUM CHLORIDE, SODIUM LACTATE, POTASSIUM CHLORIDE, CALCIUM CHLORIDE 600; 310; 30; 20 MG/100ML; MG/100ML; MG/100ML; MG/100ML
INJECTION, SOLUTION INTRAVENOUS CONTINUOUS
Status: DISCONTINUED | OUTPATIENT
Start: 2022-07-18 | End: 2022-07-18

## 2022-07-18 RX ORDER — BRIMONIDINE TARTRATE 2 MG/ML
1 SOLUTION/ DROPS OPHTHALMIC 2 TIMES DAILY
Status: DISCONTINUED | OUTPATIENT
Start: 2022-07-18 | End: 2022-07-19 | Stop reason: HOSPADM

## 2022-07-18 RX ADMIN — SODIUM CHLORIDE, POTASSIUM CHLORIDE, SODIUM LACTATE AND CALCIUM CHLORIDE: 600; 310; 30; 20 INJECTION, SOLUTION INTRAVENOUS at 06:46

## 2022-07-18 RX ADMIN — PHENYLEPHRINE HYDROCHLORIDE 0.2 MCG/KG/MIN: 10 INJECTION INTRAVENOUS at 07:43

## 2022-07-18 RX ADMIN — PROPOFOL 30 MCG/KG/MIN: 10 INJECTION, EMULSION INTRAVENOUS at 07:48

## 2022-07-18 RX ADMIN — FENTANYL CITRATE 25 MCG: 50 INJECTION, SOLUTION INTRAMUSCULAR; INTRAVENOUS at 10:29

## 2022-07-18 RX ADMIN — TRAMADOL HYDROCHLORIDE 50 MG: 50 TABLET, COATED ORAL at 22:08

## 2022-07-18 RX ADMIN — FENTANYL CITRATE 25 MCG: 50 INJECTION, SOLUTION INTRAMUSCULAR; INTRAVENOUS at 07:34

## 2022-07-18 RX ADMIN — ACETAMINOPHEN 975 MG: 325 TABLET ORAL at 22:08

## 2022-07-18 RX ADMIN — SENNOSIDES AND DOCUSATE SODIUM 1 TABLET: 50; 8.6 TABLET ORAL at 22:09

## 2022-07-18 RX ADMIN — TRANEXAMIC ACID 1950 MG: 650 TABLET ORAL at 06:00

## 2022-07-18 RX ADMIN — SODIUM CHLORIDE: 9 INJECTION, SOLUTION INTRAVENOUS at 11:23

## 2022-07-18 RX ADMIN — FENTANYL CITRATE 25 MCG: 50 INJECTION, SOLUTION INTRAMUSCULAR; INTRAVENOUS at 10:46

## 2022-07-18 RX ADMIN — ONDANSETRON 4 MG: 2 INJECTION INTRAMUSCULAR; INTRAVENOUS at 09:10

## 2022-07-18 RX ADMIN — ROCURONIUM BROMIDE 20 MG: 50 INJECTION, SOLUTION INTRAVENOUS at 07:34

## 2022-07-18 RX ADMIN — HYDROMORPHONE HYDROCHLORIDE 0.5 MG: 1 INJECTION, SOLUTION INTRAMUSCULAR; INTRAVENOUS; SUBCUTANEOUS at 09:30

## 2022-07-18 RX ADMIN — NEOSTIGMINE METHYLSULFATE 3 MG: 1 INJECTION, SOLUTION INTRAVENOUS at 09:23

## 2022-07-18 RX ADMIN — ACETAMINOPHEN 975 MG: 325 TABLET ORAL at 15:15

## 2022-07-18 RX ADMIN — CEFAZOLIN 1 G: 1 INJECTION, POWDER, FOR SOLUTION INTRAMUSCULAR; INTRAVENOUS at 22:31

## 2022-07-18 RX ADMIN — PRAVASTATIN SODIUM 20 MG: 20 TABLET ORAL at 22:08

## 2022-07-18 RX ADMIN — PROPOFOL 80 MG: 10 INJECTION, EMULSION INTRAVENOUS at 07:49

## 2022-07-18 RX ADMIN — DEXAMETHASONE SODIUM PHOSPHATE 4 MG: 4 INJECTION, SOLUTION INTRA-ARTICULAR; INTRALESIONAL; INTRAMUSCULAR; INTRAVENOUS; SOFT TISSUE at 08:09

## 2022-07-18 RX ADMIN — FENTANYL CITRATE 25 MCG: 50 INJECTION, SOLUTION INTRAMUSCULAR; INTRAVENOUS at 10:12

## 2022-07-18 RX ADMIN — Medication 2 G: at 07:42

## 2022-07-18 RX ADMIN — FENTANYL CITRATE 50 MCG: 50 INJECTION, SOLUTION INTRAMUSCULAR; INTRAVENOUS at 08:06

## 2022-07-18 RX ADMIN — ASPIRIN 81 MG: 81 TABLET, COATED ORAL at 22:08

## 2022-07-18 RX ADMIN — CEFAZOLIN 1 G: 1 INJECTION, POWDER, FOR SOLUTION INTRAMUSCULAR; INTRAVENOUS at 15:17

## 2022-07-18 RX ADMIN — ASPIRIN 81 MG: 81 TABLET, COATED ORAL at 15:15

## 2022-07-18 RX ADMIN — TRAMADOL HYDROCHLORIDE 50 MG: 50 TABLET, COATED ORAL at 15:15

## 2022-07-18 RX ADMIN — BRIMONIDINE TARTRATE 1 DROP: 2 SOLUTION/ DROPS OPHTHALMIC at 22:12

## 2022-07-18 RX ADMIN — FENTANYL CITRATE 25 MCG: 50 INJECTION, SOLUTION INTRAMUSCULAR; INTRAVENOUS at 09:13

## 2022-07-18 RX ADMIN — LIDOCAINE HYDROCHLORIDE 60 MG: 20 INJECTION, SOLUTION INFILTRATION; PERINEURAL at 07:34

## 2022-07-18 RX ADMIN — DEXMEDETOMIDINE HYDROCHLORIDE 12 MCG: 100 INJECTION, SOLUTION INTRAVENOUS at 09:28

## 2022-07-18 RX ADMIN — GLYCOPYRROLATE 0.4 MG: 0.2 INJECTION, SOLUTION INTRAMUSCULAR; INTRAVENOUS at 09:22

## 2022-07-18 NOTE — PROGRESS NOTES
SPIRITUAL HEALTH SERVICES  [facility]  PRE-SURGICAL VISIT    Had pre-surgical visit with patient.  Provided spiritual support and prayer.     Julien Hansen  Associate

## 2022-07-18 NOTE — PROGRESS NOTES
Arrived from Recovery room.  A&O, able to make needs known.   Oriented to room/unit.  Mild surgical pain, ice pack applied.  Ortho Stop[employer] discussed w/ pt.  Pt's daughter at the bedside.

## 2022-07-18 NOTE — CONSULTS
[facility]  Consult Note - Hospitalist Service     Date of Admission:  7/18/2022  Consult Requested by: Dr. Zapien  Reason for Consult: Post-op co-medical management following Left NILS  PRIMARY CARE PROVIDER:    Micki Fernandes    Assessment & Plan   Susy Chavarria is a 89 year old female admitted on 7/18/2022.    Past medical history significant for OA, HTN, HLP, Pre-diabetes, Acquired absence of kidney, GERD, PATEL, Glaucoma, Bilateral carotid artery stenosis who underwent an elective left NILS.      OA with degenerative changes of the left hip s/p left NILS  POD# 0.   - Orthopedic Service is managing.   --Defer analgesic management, DVT prophylaxis, PT/OT.     --Defer resumption of PTA ASA to Ortho.    - HGB check in the morning.    - Encourage utilization of incentive spirometer.     Post-op hypotension  HTN  Managed PTA on amlodipine 2.5 mg/d and lisinopril 2.5 mg/d.  Post-op BP low at 79/65.  Most recent BP of 114/52.      - Hold PTA amlodipine and lisinopril and reassess tomorrow if they can be resumed.    - BMP in the morning.    - Monitor closely.      Bradycardia  Patient with noted bradycardia since arrival.  Pre-op EKG with noted sinus bradycardia.    - Monitor on telemetry.      HLP  - Resumed on PTA pravastatin 20 mg at bedtime.      Pre-diabetes  A1c of 5.9% from 6/22/22.  Patient received 4 mg of IV dexamethasone intra-op and anticipate to see some hyperglycemia.    - Follow up with PCP as an outpatient.      Acquired absence of kidney  Patient donated her right kidney.    - Avoid nephrotoxic agents as able.    - BMP in the morning.      GERD  - Resumed on PTA omeprazole 20 mg/d.      PATEL  - Resumed on PTA Lexapro 5 mg every morning.      Glaucoma  - Resumed on PTA eye drops (can use home supply).       Bilateral carotid artery stenosis  2019 Right internal carotid is greater than 70%, left internal carotid is 50-69%. Follows with Dr. Hernandez, neurology for this.  -  Avoid hypotension.    - Resumed on PTA statin as above.      Clinically Significant Risk Factors Present on Admission                 # Hypertension: home medication list includes antihypertensive(s)   # Circulatory Shock: currently requiring pressors for blood pressure support           Diet: Advance Diet as Tolerated: Regular Diet Adult  Regular Diet Adult     DVT Prophylaxis: Defer to primary service   Delatorre Catheter: Not present  Central Lines: None  Cardiac Monitoring: None  Code Status: Full Code      Disposition Plan    Per Ortho.         The patient's care was discussed with the Patient and Patient's Family.    The patient has been discussed with Dr. Aguilar, who agrees with the assessment and plan at this time.    At this time, I'd like to thank Dr. Zapien for consulting the Hospitalist service.  We will continue to follow.        Curtis Garcia PA-C  [facility]  Securely message with the Vocera Web Console (learn more here)  Text page via [employer] Paging/Directory    ______________________________________________________________________    Chief Complaint   Elective left NILS.     History is obtained from the patient and EMR.      History of Present Illness   Susy Chavarria is a 89 year old female with a past medical history significant for OA, HTN, HLP, Pre-diabetes, Acquired absence of kidney, GERD, PATEL, Glaucoma, Bilateral carotid artery stenosis who underwent an elective left NILS.      Surgery was performed under general anesthesia.  EBL was documented at 100 ml.      Patient was seen in her hospital room where she was lying comfortably in bed upon arrival with her daughter present at bedside.  Initially, we reviewed her medical and surgical history as well as her home medications.    Upon questioning, patient states she always feels tired.  She also has almost daily or every other day headaches.  She notices that her feet swell most days.  She has issues with postnasal  drip and increased phlegm production which causes her to cough frequently.  She currently feels nauseated and has had ongoing issues with constipation.  She has joint pain at baseline in multiple areas.  Patient feels she bruises quite easily as she is on an aspirin daily.    Reviewed plans with patient in regards to management of blood pressure (hold parameters) and discussed her low heart rate.  Patient understood and agreed with this plan.        Review of Systems   The 10 point Review of Systems is negative other than noted in the HPI.    Past Medical History    I have reviewed this patient's medical history and updated it with pertinent information if needed.   Past Medical History:   Diagnosis Date     Acquired absence of kidney      Arthritis      Gastroesophageal reflux disease      High cholesterol      Hypertension      Pre-diabetes    OA, HTN, HLP, Pre-diabetes, Acquired absence of kidney, GERD, PATEL, Glaucoma, Bilateral carotid artery stenosis    Past Surgical History   I have reviewed this patient's surgical history and updated it with pertinent information if needed.  Past Surgical History:   Procedure Laterality Date     cataracts      bilateral     COLONOSCOPY       COLONOSCOPY  10/21/2011    Procedure:COLONOSCOPY; COLONOSCOPY ; Surgeon:ALEX VALDEZ; Location: GI     EYE SURGERY       JOINT REPLACEMENT Left     L knee     JOINT REPLACEMENT Right     R hip     NEPHRECTOMY      donor     RELEASE CARPAL TUNNEL      right wrist       Social History   I have reviewed this patient's social history and updated it with pertinent information if needed.  Patient resides in an an independent apartment and a senior facility in [place], [place].  She has never smoked.  She consumes alcohol seldomly.  She does not use illicit drugs.  She has been utilizing a cane lately.  Social History     Tobacco Use     Smoking status: Never Smoker     Smokeless tobacco: Never Used   Vaping Use     Vaping Use: Never used    Substance Use Topics     Alcohol use: Yes     Comment: 1 drink weekly     Drug use: Never       Family History   I have reviewed this patient's family history and updated it with pertinent information if needed.   Family History   Problem Relation Age of Onset     Cancer Father    Father: Colon cancer  Mother: CAD/MI  Brother: CAD/MI and diabetes  Sister: Lymphoma and diabetes    Medications   Prior to Admission Medications   Prescriptions Last Dose Informant Patient Reported? Taking?   Calcium Carb-Cholecalciferol (CALCIUM CARBONATE-VITAMIN D3) 600-400 MG-UNIT TABS Past Week at AM Self Yes Yes   Sig: Take 1 tablet by mouth every morning   Multiple Vitamins-Minerals (MULTIVITAMIN ADULTS 50+ PO)  at AM Self Yes Yes   Sig: Take 1 tablet by mouth daily   acetaminophen (TYLENOL) 650 MG CR tablet Past Week at PM Self Yes Yes   Sig: Take 650 mg by mouth as needed for mild pain or fever   amLODIPine (NORVASC) 2.5 MG tablet 7/18/2022 at AM Self Yes Yes   Sig: Take 2.5 mg by mouth every morning   amoxicillin (AMOXIL) 500 MG tablet More than a month at Unknown time Self Yes Yes   Sig: Take 2,000 mg by mouth once (4 x 500 mg)  Prophylactic prior to dental appointment   aspirin 81 MG tablet Past Month at AM Self Yes Yes   Sig: Take 1 tablet by mouth daily with food   brimonidine (ALPHAGAN) 0.2 % ophthalmic solution 7/18/2022 at AM Self Yes Yes   Sig: Place 1 drop into both eyes 2 times daily   dorzolamide (TRUSOPT) 2 % ophthalmic solution 7/18/2022 at AM Self Yes Yes   Sig: Place 1 drop Into the left eye daily   escitalopram (LEXAPRO) 5 MG tablet 7/18/2022 at AM Self Yes Yes   Sig: Take 5 mg by mouth every morning   lisinopril (ZESTRIL) 2.5 MG tablet Past Week at PM Self Yes Yes   Sig: Take 2.5 mg by mouth every evening   omeprazole (PRILOSEC) 20 MG capsule 7/18/2022 at AM Self Yes Yes   Sig: Take 20 mg by mouth daily ( Before a meal)   polyethylene glycol-propylene glycol (SYSTANE ULTRA) 0.4-0.3 % SOLN ophthalmic solution  "7/18/2022 at PRN Self Yes Yes   Sig: Place 1 drop into both eyes 2 times daily as needed for dry eyes   pravastatin (PRAVACHOL) 20 MG tablet 7/17/2022 at HS Self Yes Yes   Sig: Take 20 mg by mouth At Bedtime      Facility-Administered Medications: None     Allergies   No Known Allergies    Physical Exam   /52 (BP Location: Right arm)   Pulse 51   Temp 97.3  F (36.3  C) (Oral)   Resp 12   Ht 1.549 m (5' 1\")   Wt 45.4 kg (100 lb)   SpO2 92%   BMI 18.89 kg/m      Constitutional: Awake, alert, cooperative, no apparent distress.    ENT: Normocephalic, without obvious abnormality, atraumatic, oral pharynx with moist mucus membranes, tonsils without erythema or exudates.  Eyes pupils are equal, round and reactive to light; extra occular movements intact.  Normal sclera.    Neck: Supple, symmetrical, trachea midline, no adenopathy.  Pulmonary: No increased work of breathing, fair air exchange, clear to auscultation bilaterally, no crackles or wheezing.  Cardiovascular: Regular rhythm and bradycardic, normal S1 and S2, no S3 or S4, and no murmur noted.  GI: Hypoactive bowel sounds, soft, non-distended, non-tender.    Skin/Integumen: Visualized skin appeared clear.  Neuro: CN II-XII grossly intact.  Upper extremities strength, coordination and sensation intact bilaterally.  Deferred lower extremities assessment due to post-op state.    Psych:  Alert and oriented x 3. Normal affect.  Extremities: No lower extremity edema noted, and calves are non-tender to palpation bilaterally.     Data   I personally reviewed no images or EKG's today.  Results for orders placed or performed during the hospital encounter of 07/18/22 (from the past 24 hour(s))   ABO/Rh type and screen    Narrative    The following orders were created for panel order ABO/Rh type and screen.  Procedure                               Abnormality         Status                     ---------                               -----------         ------       "               Adult Type and Screen[[id number]]                            Final result                 Please view results for these tests on the individual orders.   Adult Type and Screen   Result Value Ref Range    ABO/RH(D) O POS     Antibody Screen Negative Negative    SPECIMEN EXPIRATION DATE [id number]    XR Pelvis Port 1/2 Views    Narrative    XR PELVIS PORT 1/2 VIEWS 7/18/2022 10:38 AM     HISTORY: Status post Hip surgery    COMPARISON: None.      Impression    IMPRESSION: Bilateral NILS. Components are well seated. No fractures  are identified.    DANO ODEN MD         SYSTEM ID:  [id number]

## 2022-07-18 NOTE — BRIEF OP NOTE
[facility]    Brief Operative Note    Pre-operative diagnosis: Osteoarthritis of left hip [M16.12]  Post-operative diagnosis Same as pre-operative diagnosis    Procedure: Procedure(s):  LEFT TOTAL HIP ARTHROPLASTY  Surgeon: Surgeon(s) and Role:     * Fortino Zapien MD - Primary     ASSt  aung Epperson RNFA  Anesthesia: Choice   Estimated Blood Loss: Less than 100 ml  Traneamic  Acid  givebn  Pre op in par.  Drains: None  Specimens: * No specimens in log *  Findings:   None.  Complications: None.  Implants:   Implant Name Type Inv. Item Serial No.  Lot No. LRB No. Used Action   IMP CUP ACE PINNACLE 50MM 1217-22050 - [id number] Total Joint Component/Insert IMP CUP ACE PINNACLE 50MM 1217-  [employer] CARE [facility]- [id number] Left 1 Implanted   IMP SCR BONE CAN ACE 6.5X30MM 1217- - [id number] Metallic Hardware/[place] IMP SCR BONE CAN ACE 6.5X30MM 1217-  [employer]- [id number] Left 1 Implanted   IMP STEM FEM HIP DEPUY [place] TPR SZ 2 HI OFF 1570- - [id number] Total Joint Component/Insert IMP STEM FEM HIP DEPUY [place] TPR SZ 2 HI OFF 1570-  [employer]- [id number] Left 1 Implanted   IMP LINER HIP DEPUY PINNACLE ALTRX [id number] +4 1221- - [id number] Total Joint Component/Insert IMP LINER HIP DEPUY PINNACLE ALTRX [id number] +4 1221-  [employer]ARE [employer]- [id number] Left 1 Implanted   IMP HEAD FEMORAL DEPUY 32MM +1 1365- - [id number] Total Joint Component/Insert IMP HEAD FEMORAL DEPUY 32MM +1 1365-  [employer]- [id number] Left 1 Implanted

## 2022-07-18 NOTE — ANESTHESIA CARE TRANSFER NOTE
Patient: Susy Chavarria    Procedure: Procedure(s):  LEFT TOTAL HIP ARTHROPLASTY       Diagnosis: Osteoarthritis of left hip [M16.12]  Diagnosis Additional Information: No value filed.    Anesthesia Type:   General     Note:    Oropharynx: oropharynx clear of all foreign objects  Level of Consciousness: drowsy and awake  Oxygen Supplementation: face mask  Level of Supplemental Oxygen (L/min / FiO2): 6  Independent Airway: airway patency satisfactory and stable  Dentition: dentition unchanged  Vital Signs Stable: post-procedure vital signs reviewed and stable  Report to RN Given: handoff report given  Patient transferred to: PACU    Handoff Report: Identifed the Patient, Identified the Reponsible Provider, Reviewed the pertinent medical history, Discussed the surgical course, Reviewed Intra-OP anesthesia mangement and issues during anesthesia, Set expectations for post-procedure period and Allowed opportunity for questions and acknowledgement of understanding      Electronically Signed By: IVIS Luo CRNA  July 18, 2022  9:48 AM

## 2022-07-18 NOTE — ANESTHESIA POSTPROCEDURE EVALUATION
Patient: Susy Chavarria    Procedure: Procedure(s):  LEFT TOTAL HIP ARTHROPLASTY       Anesthesia Type:  General    Note:  Disposition: Admission   Postop Pain Control: Uneventful            Sign Out: Well controlled pain   PONV: No   Neuro/Psych: Uneventful            Sign Out: Acceptable/Baseline neuro status   Airway/Respiratory: Uneventful            Sign Out: Acceptable/Baseline resp. status   CV/Hemodynamics: Uneventful            Sign Out: Acceptable CV status; No obvious hypovolemia; No obvious fluid overload   Other NRE: NONE   DID A NON-ROUTINE EVENT OCCUR? No           Last vitals:  Vitals Value Taken Time   /48 07/18/22 1040   Temp 36.1  C (96.9  F) 07/18/22 1050   Pulse 48 07/18/22 1051   Resp 9 07/18/22 1051   SpO2 97 % 07/18/22 1055   Vitals shown include unvalidated device data.    Electronically Signed By: JOHN CAMACHO MD  July 18, 2022  11:04 AM

## 2022-07-18 NOTE — CONSULTS
Care Management Initial Consult    General Information  Assessment completed with: VM-chart review, Care Team Member, SOY care plan on file  Type of CM/SW Visit: Initial Assessment    Primary Care Provider verified and updated as needed: Yes   Readmission within the last 30 days:        Reason for Consult: discharge planning  Advance Care Planning:            Communication Assessment  Patient's communication style: spoken language (English or Bilingual)    Hearing Difficulty or Deaf: no   Wear Glasses or Blind: yes    Cognitive  Cognitive/Neuro/Behavioral: WDL        Orientation: oriented x 4             Living Environment:   People in home: alone     Current living Arrangements: apartment      Able to return to prior arrangements: yes       Family/Social Support:  Care provided by: self, child(franklyn)  Provides care for: no one  Marital Status:   Children          Description of Support System: Supportive, Involved         Current Resources:   Patient receiving home care services:       Community Resources:    Equipment currently used at home: cane, straight  Supplies currently used at home:      Employment/Financial:  Employment Status: retired        Financial Concerns:             Lifestyle & Psychosocial Needs:  Social Determinants of Health     Tobacco Use: Low Risk      Smoking Tobacco Use: Never Smoker     Smokeless Tobacco Use: Never Used   Alcohol Use: Not on file   Financial Resource Strain: Not on file   Food Insecurity: Not on file   Transportation Needs: Not on file   Physical Activity: Not on file   Stress: Not on file   Social Connections: Not on file   Intimate Partner Violence: Not on file   Depression: Not on file   Housing Stability: Not on file       Functional Status:  Prior to admission patient needed assistance:              Mental Health Status:          Chemical Dependency Status:                Values/Beliefs:  Spiritual, Cultural Beliefs, Mormonism Practices, Values that affect care:     Description of Beliefs that Will Affect Care: [facility]            Additional Information:  Consult for discharge planning. Patient admitted on 7/18/22 for LTHA and a tentative discharge date of 7/19/22.  Writer reviewed chart and  [facility] care plan on file. Patient met with [facility] RN CC prior to surgery and noted that TCU will be needed and preferred choices are [facility], [facility], [facility] and [facility]. Writer received a call from [facility] that they had received a request and that they would have a bed available for patient. Writer spoke to patient and family in room. They are agreeable to a shared room at [facility] but [facility]. Call placed to [facility] and message left requesting an update.    Patient has been vaccinated for COVID and has had the booster. Writer encouraged patient and/or family to reach out to facility directly for most up to date visitor guidelines.     Writer discussed transportation options and possible out of pocket costs of transport with patient. Patient would like daughter to transport at discharge.       JENI Vera

## 2022-07-18 NOTE — PROGRESS NOTES
Patient vital signs are at baseline: Yes  Patient able to ambulate as they were prior to admission or with assist devices provided by therapies during their stay:  No,  Reason:  Pt this pm  Patient MUST void prior to discharge:  No,  Reason:  Denies urge to urinate at this time, denies bladder fullness or discomfort, encouraged po fluid intake  Patient able to tolerate oral intake:  Yes  Pain has adequate pain control using Oral analgesics:  No,  Reason:  Pre-medicated w/ Tramadol and Tylenol for PT  Does patient have an identified :  No,  Reason:  N/A pt is discharging to TCU, SW following  Has goal D/C date and time been discussed with patient:  Yes, TCU placement

## 2022-07-18 NOTE — ANESTHESIA PREPROCEDURE EVALUATION
Anesthesia Pre-Procedure Evaluation    Patient: Susy Chavarria   MRN: [id number] : 1932        Procedure : Procedure(s):  LEFT TOTAL HIP ARTHROPLASTY          Past Medical History:   Diagnosis Date     Acquired absence of kidney      Arthritis      Gastroesophageal reflux disease      High cholesterol      Hypertension      Pre-diabetes       Past Surgical History:   Procedure Laterality Date     cataracts      bilateral     COLONOSCOPY       COLONOSCOPY  10/21/2011    Procedure:COLONOSCOPY; COLONOSCOPY ; Surgeon:ALEX VALDEZ; Location: GI     EYE SURGERY       JOINT REPLACEMENT Left     L knee     JOINT REPLACEMENT Right     R hip     NEPHRECTOMY      donor     RELEASE CARPAL TUNNEL      right wrist      No Known Allergies   Social History     Tobacco Use     Smoking status: Never Smoker     Smokeless tobacco: Never Used   Substance Use Topics     Alcohol use: Yes     Comment: 1 drink weekly      Wt Readings from Last 1 Encounters:   19 45.8 kg (101 lb)        Anesthesia Evaluation            ROS/MED HX  ENT/Pulmonary:    (-) sleep apnea   Neurologic:       Cardiovascular:     (+) Dyslipidemia hypertension-Peripheral Vascular Disease-- Carotid Stenosis. ---    METS/Exercise Tolerance:     Hematologic:       Musculoskeletal:       GI/Hepatic:     (+) GERD, Asymptomatic on medication,     Renal/Genitourinary: Comment: Single kidney - donated other;      Endo:       Psychiatric/Substance Use:       Infectious Disease:       Malignancy:       Other:            Physical Exam    Airway        Mallampati: I   TM distance: > 3 FB   Neck ROM: full   Mouth opening: > 3 cm    Respiratory Devices and Support         Dental  no notable dental history         Cardiovascular   cardiovascular exam normal          Pulmonary   pulmonary exam normal                OUTSIDE LABS:  CBC:   Lab Results   Component Value Date    WBC 7.7 03/15/2021    WBC 8.7 2019    HGB 13.5 03/15/2021    HGB 13.7 2019     HCT 40.4 03/15/2021    HCT 39.7 09/23/2019     03/15/2021     09/23/2019     BMP:   Lab Results   Component Value Date     03/15/2021     09/23/2019    POTASSIUM 4.6 03/15/2021    POTASSIUM 3.9 09/23/2019    CHLORIDE 107 03/15/2021    CHLORIDE 105 09/23/2019    CO2 26 03/15/2021    CO2 26 09/23/2019    BUN 31 (H) 03/15/2021    BUN 35 (H) 09/23/2019    CR 0.96 03/15/2021    CR 1.04 09/23/2019     (H) 03/15/2021     (H) 09/23/2019     COAGS:   Lab Results   Component Value Date    PTT 26 01/12/2008    INR 1.46 (H) 01/16/2008     POC: No results found for: BGM, HCG, HCGS  HEPATIC:   Lab Results   Component Value Date    ALBUMIN 3.8 09/23/2019    PROTTOTAL 7.0 09/23/2019    ALT 23 09/23/2019    AST 25 09/23/2019    ALKPHOS 67 09/23/2019    BILITOTAL 0.5 09/23/2019     OTHER:   Lab Results   Component Value Date    TIARA 9.9 03/15/2021       Anesthesia Plan    ASA Status:  3   NPO Status:  NPO Appropriate    Anesthesia Type: General.     - Airway: ETT   Induction: Intravenous.   Maintenance: Balanced.        Consents    Anesthesia Plan(s) and associated risks, benefits, and realistic alternatives discussed. Questions answered and patient/representative(s) expressed understanding.    - Discussed:     - Discussed with:  Patient         Postoperative Care    Pain management: IV analgesics.   PONV prophylaxis: Ondansetron (or other 5HT-3)     Comments:                JOHN CAMACHO MD

## 2022-07-19 ENCOUNTER — APPOINTMENT (OUTPATIENT)
Dept: PHYSICAL THERAPY | Facility: CLINIC | Age: 87
End: 2022-07-19
Attending: ORTHOPAEDIC SURGERY
Payer: MEDICARE

## 2022-07-19 VITALS
HEIGHT: 61 IN | TEMPERATURE: 98.5 F | OXYGEN SATURATION: 98 % | HEART RATE: 85 BPM | SYSTOLIC BLOOD PRESSURE: 118 MMHG | RESPIRATION RATE: 16 BRPM | WEIGHT: 100 LBS | BODY MASS INDEX: 18.88 KG/M2 | DIASTOLIC BLOOD PRESSURE: 68 MMHG

## 2022-07-19 LAB
ANION GAP SERPL CALCULATED.3IONS-SCNC: 7 MMOL/L (ref 3–14)
BUN SERPL-MCNC: 30 MG/DL (ref 7–30)
CALCIUM SERPL-MCNC: 8.9 MG/DL (ref 8.5–10.1)
CHLORIDE BLD-SCNC: 107 MMOL/L (ref 94–109)
CO2 SERPL-SCNC: 24 MMOL/L (ref 20–32)
CREAT SERPL-MCNC: 1.12 MG/DL (ref 0.52–1.04)
FASTING STATUS PATIENT QL REPORTED: NO
GFR SERPL CREATININE-BSD FRML MDRD: 46 ML/MIN/1.73M2
GLUCOSE BLD-MCNC: 114 MG/DL (ref 70–99)
GLUCOSE BLD-MCNC: 114 MG/DL (ref 70–99)
GLUCOSE BLDC GLUCOMTR-MCNC: 104 MG/DL (ref 70–99)
HGB BLD-MCNC: 12.4 G/DL (ref 11.7–15.7)
POTASSIUM BLD-SCNC: 4.3 MMOL/L (ref 3.4–5.3)
SODIUM SERPL-SCNC: 138 MMOL/L (ref 133–144)

## 2022-07-19 PROCEDURE — 250N000013 HC RX MED GY IP 250 OP 250 PS 637: Performed by: ORTHOPAEDIC SURGERY

## 2022-07-19 PROCEDURE — 97110 THERAPEUTIC EXERCISES: CPT | Mod: GP,CQ | Performed by: PHYSICAL THERAPY ASSISTANT

## 2022-07-19 PROCEDURE — 85018 HEMOGLOBIN: CPT | Performed by: ORTHOPAEDIC SURGERY

## 2022-07-19 PROCEDURE — 250N000011 HC RX IP 250 OP 636: Performed by: ORTHOPAEDIC SURGERY

## 2022-07-19 PROCEDURE — 82962 GLUCOSE BLOOD TEST: CPT

## 2022-07-19 PROCEDURE — 36415 COLL VENOUS BLD VENIPUNCTURE: CPT | Performed by: PHYSICIAN ASSISTANT

## 2022-07-19 PROCEDURE — 97530 THERAPEUTIC ACTIVITIES: CPT | Mod: GP | Performed by: PHYSICAL THERAPY ASSISTANT

## 2022-07-19 PROCEDURE — 80048 BASIC METABOLIC PNL TOTAL CA: CPT | Performed by: PHYSICIAN ASSISTANT

## 2022-07-19 PROCEDURE — 97116 GAIT TRAINING THERAPY: CPT | Mod: GP,CQ | Performed by: PHYSICAL THERAPY ASSISTANT

## 2022-07-19 PROCEDURE — 99232 SBSQ HOSP IP/OBS MODERATE 35: CPT | Performed by: HOSPITALIST

## 2022-07-19 PROCEDURE — 999N000111 HC STATISTIC OT IP EVAL DEFER

## 2022-07-19 RX ORDER — TRAMADOL HYDROCHLORIDE 50 MG/1
50 TABLET ORAL EVERY 6 HOURS PRN
Qty: 30 TABLET | Refills: 0 | Status: SHIPPED | OUTPATIENT
Start: 2022-07-19 | End: 2023-10-03

## 2022-07-19 RX ORDER — ASPIRIN 81 MG/1
81 TABLET ORAL 2 TIMES DAILY
Qty: 60 TABLET | Refills: 0 | DISCHARGE
Start: 2022-07-19 | End: 2023-10-03

## 2022-07-19 RX ORDER — AMOXICILLIN 250 MG
1 CAPSULE ORAL 2 TIMES DAILY PRN
Qty: 30 TABLET | Refills: 0 | Status: ON HOLD | DISCHARGE
Start: 2022-07-19 | End: 2023-10-04

## 2022-07-19 RX ORDER — ACETAMINOPHEN 325 MG/1
975 TABLET ORAL EVERY 8 HOURS
Qty: 100 TABLET | Refills: 0 | Status: SHIPPED | OUTPATIENT
Start: 2022-07-19 | End: 2022-07-19

## 2022-07-19 RX ORDER — HYDROXYZINE HYDROCHLORIDE 10 MG/1
10 TABLET, FILM COATED ORAL EVERY 6 HOURS PRN
Qty: 30 TABLET | Refills: 0 | DISCHARGE
Start: 2022-07-19 | End: 2023-10-03

## 2022-07-19 RX ORDER — POLYETHYLENE GLYCOL 3350 17 G/17G
1 POWDER, FOR SOLUTION ORAL DAILY
Qty: 7 PACKET | Refills: 0 | DISCHARGE
Start: 2022-07-19

## 2022-07-19 RX ORDER — ONDANSETRON 4 MG/1
4 TABLET, ORALLY DISINTEGRATING ORAL EVERY 6 HOURS PRN
Qty: 5 TABLET | Refills: 0 | DISCHARGE
Start: 2022-07-19 | End: 2023-10-03

## 2022-07-19 RX ORDER — ACETAMINOPHEN 325 MG/1
650 TABLET ORAL EVERY 4 HOURS PRN
Qty: 100 TABLET | Refills: 0 | DISCHARGE
Start: 2022-07-19 | End: 2023-10-03

## 2022-07-19 RX ORDER — ONDANSETRON 4 MG/1
4 TABLET, ORALLY DISINTEGRATING ORAL EVERY 6 HOURS PRN
Qty: 5 TABLET | Refills: 0 | Status: SHIPPED | OUTPATIENT
Start: 2022-07-19 | End: 2022-07-19

## 2022-07-19 RX ADMIN — ESCITALOPRAM 5 MG: 5 TABLET, FILM COATED ORAL at 09:07

## 2022-07-19 RX ADMIN — BRIMONIDINE TARTRATE 1 DROP: 2 SOLUTION/ DROPS OPHTHALMIC at 09:07

## 2022-07-19 RX ADMIN — HYDROMORPHONE HYDROCHLORIDE 0.4 MG: 0.2 INJECTION, SOLUTION INTRAMUSCULAR; INTRAVENOUS; SUBCUTANEOUS at 03:33

## 2022-07-19 RX ADMIN — ASPIRIN 81 MG: 81 TABLET, COATED ORAL at 09:06

## 2022-07-19 RX ADMIN — ACETAMINOPHEN 975 MG: 325 TABLET ORAL at 13:47

## 2022-07-19 RX ADMIN — ACETAMINOPHEN 975 MG: 325 TABLET ORAL at 06:02

## 2022-07-19 RX ADMIN — HYDROXYZINE HYDROCHLORIDE 10 MG: 10 TABLET ORAL at 00:59

## 2022-07-19 RX ADMIN — SENNOSIDES AND DOCUSATE SODIUM 1 TABLET: 50; 8.6 TABLET ORAL at 09:07

## 2022-07-19 RX ADMIN — POLYETHYLENE GLYCOL 3350 17 G: 17 POWDER, FOR SOLUTION ORAL at 09:07

## 2022-07-19 RX ADMIN — PANTOPRAZOLE SODIUM 40 MG: 20 TABLET, DELAYED RELEASE ORAL at 09:06

## 2022-07-19 RX ADMIN — DORZOLAMIDE HCL 1 DROP: 20 SOLUTION/ DROPS OPHTHALMIC at 09:08

## 2022-07-19 RX ADMIN — HYDROXYZINE HYDROCHLORIDE 10 MG: 10 TABLET ORAL at 11:20

## 2022-07-19 NOTE — PLAN OF CARE
Goal Outcome Evaluation:    Patient vital signs are at baseline: Yes  Patient able to ambulate as they were prior to admission or with assist devices provided by therapies during their stay:  Yes  Patient MUST void prior to discharge:  Yes  Patient able to tolerate oral intake:  Yes  Pain has adequate pain control using Oral analgesics:  Yes  Does patient have an identified :  Yes  Has goal D/C date and time been discussed with patient:  Yes discharged to TCU.    Pt A&OX4, VSS on RA, ASX1 with GB and walker. Reviewed discharge instruction with the patient and discharged to TCU.

## 2022-07-19 NOTE — PLAN OF CARE
Patient vital signs are at baseline: Yes  Patient able to ambulate as they were prior to admission or with assist devices provided by therapies during their stay:  Yes assist of 1-2 with GW  Patient MUST void prior to discharge:  Yes Voiding in bsc  Patient able to tolerate oral intake:  Yes  Pain has adequate pain control using Oral analgesics:  Yes managed with prn tramadol and schedule tylenol  Does patient have an identified :  No Reason: Discharge pending TCU  Has goal D/C date and time been discussed with patient:  Yes TCU placement

## 2022-07-19 NOTE — PROGRESS NOTES
Care Management Discharge Note    Discharge Date: 07/19/2022       Discharge Disposition: Transitional Care, Skilled Nursing Facility    Discharge Services: None    Discharge DME: None    Discharge Transportation: family or friend will provide    Private pay costs discussed: private room/amenity fees    PAS Confirmation Code: [id number]  Patient/family educated on Medicare website which has current facility and service quality ratings: yes    Education Provided on the Discharge Plan:    Persons Notified of Discharge Plans: yes  Patient/Family in Agreement with the Plan: yes    Handoff Referral Completed: No    Additional Information:  Patient accepted to [facility] today. Patient will transport via daughter at 1600. Daughter will arrive to patient room and anticipates that they will leave at 1600. Discharge orders received for discharge today and faxed to [facility] [employer]. Writer confirmed ride time with facility, patient and family. PAS completed.     PAS-RR    D: Per DHS regulation, SW completed and submitted PAS-RR to [place] [employer]ging Direct Connect via the Senior LinkAge Line.  PAS-RR confirmation # is : [id number]    I: SW spoke with patient and they are aware a PAS-RR has been submitted.  SW reviewed with patient that they may be contacted for a follow up appointment within 10 days of hospital discharge if their SNF stay is < 30 days.  Contact information for Senior LinkAge Line was also provided.    A: patient verbalized understanding.    P: Further questions may be directed to [facility] LinkAge Line at #[phone], option #4 for PAS-RR staff.      Patient is in observation and will need to use the COVID waiver[facility] and [facility].    Care Transitions staff discussed current COVID 19 pandemic and Medicare waiver of the traditional 3 day stay requirement. Referred patient to medicare.gov, insurance provider, and admissions department at accepting facility for further questions or concerns on coverage for SNF  stay.    1. This patient has been evacuated from a nursing home in the emergency area? yes or no: no  2. This patient is being discharged from a hospital (in the emergency area) in order to provide care to more seriously ill patients? yes or no: no  3. This patient needs SNF care as a result of the emergency, regardless of whether he/she was in a hospital/nursing home prior to this stay? yes or no: yes            JENI Vera

## 2022-07-19 NOTE — PROGRESS NOTES
"ORTHOPEDIC LOWER EXTREMITY PROGRESS NOTE    POD#1  Patient is a 89 year old female who underwent Procedure(s):  LEFT TOTAL HIP ARTHROPLASTY on 2022. Pain is well controlled. Tolerating medication well, no nausea or vomiting.  PT here working with patient.  Discussed plan/expectations for discharge to TCU.      Vitals:   Blood pressure 122/53, pulse 54, temperature 97.5  F (36.4  C), temperature source Oral, resp. rate 16, height 1.549 m (5' 1\"), weight 45.4 kg (100 lb), SpO2 95 %.  Temp (24hrs), Av.1  F (36.2  C), Min:96.7  F (35.9  C), Max:97.5  F (36.4  C)      Drains: None    EXAM   The patient is awake and alert.  Calves are soft and non-tender.   Sensation is intact.  Dorsiflexion and plantar flexion is intact.  Foot warm with nl cap refill.  The incision is covered. Dressing CDI    Labs:   Recent Labs   Lab Test 03/15/21  1230 19  0314 04/15/17  1047   HGB 13.5 13.7 14.5       ASSESSMENT  S/p L NILS   PLAN  1. DVT prophylaxis: aspirin  2. Weight Bearing: WBAT (Weight bearing as tolerated).  3. Anticipated discharge date pending placement. Discharge to Skilled Nursing Facility, SW following, appreciate assistance.  4. Cont Pain Control Tylenol and Tramadol   5. Awaiting morning Hgb    Jovita Denney PA-C  [facility]        "

## 2022-07-19 NOTE — PROGRESS NOTES
07/18/22 1500   Quick Adds   Type of Visit Initial PT Evaluation   Living Environment   People in Home alone   Current Living Arrangements independent living facility   Home Accessibility no concerns   Transportation Anticipated family or friend will provide   Living Environment Comments Pt lives in senior ILF, pt is planning to go to TCU upon discharge   Self-Care   Usual Activity Tolerance moderate   Current Activity Tolerance poor   Equipment Currently Used at Home cane, straight   Fall history within last six months no   Activity/Exercise/Self-Care Comment pt was Mod-I using SEC for mobility, was IND with ADL's   General Information   Onset of Illness/Injury or Date of Surgery 07/18/22   Referring Physician Fortino Zapien MD   Patient/Family Therapy Goals Statement (PT) pt wants to discharge to TCU   Pertinent History of Current Problem (include personal factors and/or comorbidities that impact the POC) Susy Chavarria is a 89 year old female admitted on 7/18/2022. Past medical history significant for OA, HTN, HLP, Pre-diabetes, Acquired absence of kidney, GERD, PATEL, Glaucoma, Bilateral carotid artery stenosis who underwent an elective left NILS on 7/18/2022, POD#0   Existing Precautions/Restrictions fall;no hip IR;no active hip ABD;no hip ADD past midline;90 degree hip flexion;no pivoting or twisting;weight bearing   Weight-Bearing Status - LLE weight-bearing as tolerated   Cognition   Affect/Mental Status (Cognition) WNL   Orientation Status (Cognition) oriented x 4   Follows Commands (Cognition) WNL   Pain Assessment   Patient Currently in Pain   (4/10 left hip)   Integumentary/Edema   Integumentary/Edema Comments dressing on left hip appears CDI   Posture    Posture Forward head position;Protracted shoulders   Range of Motion (ROM)   Range of Motion ROM deficits secondary to surgical procedure;ROM deficits secondary to pain   ROM Comment deficits with left hip after NILS and hip precautions, otherwise  appears grossly WFL   Strength (Manual Muscle Testing)   Strength (Manual Muscle Testing) Able to perform R SLR;Able to perform L SLR;Deficits observed during functional mobility   Strength Comments not formally tested, deficits with left hip and LLE after NILS, appears grossly antigravity   Bed Mobility   Comment, (Bed Mobility) supine<>sit SBA   Transfers   Comment, (Transfers) sit<>stand with FWW Andra   Gait/Stairs (Locomotion)   Comment, (Gait/Stairs) pt did not ambulate   Balance   Balance Comments sitting EOB unsupported, some unsteadiness standing with FWW   Sensory Examination   Sensory Perception patient reports no sensory changes   Clinical Impression   Criteria for Skilled Therapeutic Intervention Yes, treatment indicated   PT Diagnosis (PT) impaired gait and mobility   Influenced by the following impairments pain, deficits with functional ROM, strength, and balance   Functional limitations due to impairments reduced activity tolerance, reduced IND with funcitonal transfers, ambulation, and ADL's   Clinical Presentation (PT Evaluation Complexity) Stable/Uncomplicated   Clinical Presentation Rationale PMH and clinical judgement   Clinical Decision Making (Complexity) low complexity   Planned Therapy Interventions (PT) balance training;bed mobility training;cryotherapy;gait training;home exercise program;patient/family education;ROM (range of motion);strengthening;stretching;transfer training;progressive activity/exercise   Anticipated Equipment Needs at Discharge (PT)   (pt will provide SEC and FWW)   Risk & Benefits of therapy have been explained evaluation/treatment results reviewed;care plan/treatment goals reviewed;risks/benefits reviewed;current/potential barriers reviewed;participants voiced agreement with care plan;participants included;patient;daughter   PT Discharge Planning   PT Discharge Recommendation (DC Rec)   (defer to ortho)   PT Rationale for DC Rec Pt is below baseline, limited by nausea,  dizziness and pain. Anticipate pt can progress to Mod-I for bed mobility, SBA with transfers and ambulating short distance with FWW. Pt is planning to discharge to TCU as she lives alone in ILF   PT Brief overview of current status bed mobility SBA, sit<>stand with FWW CGA   Plan of Care Review   Plan of Care Reviewed With patient;daughter   Total Evaluation Time   Total Evaluation Time (Minutes) 12   Physical Therapy Goals   PT Frequency 2x/day   PT Predicted Duration/Target Date for Goal Attainment 07/19/22   PT Goals Bed Mobility;Transfers;Gait   PT: Bed Mobility Modified independent;Supine to/from sit;Rolling;Bridging;Within precautions   PT: Transfers Supervision/stand-by assist;Sit to/from stand;Assistive device;Within precautions   PT: Gait Supervision/stand-by assist;Rolling walker;25 feet

## 2022-07-19 NOTE — PROGRESS NOTES
[facility]  Hospitalist Progress Note        Bigg Avilez MD   07/19/2022        Interval History:        - Reports feeling fine; pain seems reasonably well controlled; working with physical therapy  - post op hypotension improved; will resume PTA BP meds including amlodipine and lisinopril         Assessment and Plan:        Susy Chavarria is a 89 year old female with PMH significant for OA, HTN, HLP, Pre-diabetes, Acquired absence of kidney, GERD, PATEL, Glaucoma, Bilateral carotid artery stenosis who underwent an elective left NILS for end stage OA, admitted on 7/18/2022. Hospital service consulted for medical management.     End stage OA with degenerative changes of the left hip s/p left NILS 7/18/22  Post-op hypotension  HTN  -postop cares including pain control and DVT prophylaxis per orthopedics  - started on ASA bid for DVT prophylaxis  - post op hypotension improved; will resume PTA BP meds including amlodipine and lisinopril  - continue PT, planned for discharge to TCU  - post op Hb ---12.4     Asymptomatic sinus Bradycardia  - HR stable in 50s; asymptomatic with no dizziness , palpitations or syncope ; no chest pain or shortness of breath   - likely baseline, monitor clinically   - EKG noted sinus bradycardia.      HLP  - continue PTA pravastatin 20 mg at bedtime.       Pre-diabetes  A1c of 5.9% from 6/22/22.  Patient received 4 mg of IV dexamethasone intra-op and anticipate to see some hyperglycemia.    - Follow up with PCP as an outpatient.       Acquired absence of kidney  Patient donated her right kidney.    - Avoid nephrotoxic agents as able  - CR 1.12; monitor BMP with follow up      GERD  - continue PTA omeprazole 20 mg/d.       PATEL  - continue PTA Lexapro 5 mg every morning.       Glaucoma  - continue on PTA eye drops (can use home supply).        Bilateral carotid artery stenosis  2019 Right internal carotid is greater than 70%, left internal carotid is 50-69%. Follows with  "Dr. Hernandez, neurology for this.  - Avoid hypotension.    - Resumed on PTA statin as above.      Clinically Significant Risk Factors Present on Admission                 # Hypertension: home medication list includes antihypertensive(s)   # Circulatory Shock: currently requiring pressors for blood pressure support            DVT Prophylaxis: per Ortho    Code status: DNR/DNI    Disposition: per Ortho, planned for TCU    Page Me (7 am to 6 pm)    Care plan discussed with patient and her daughter present in room              Physical Exam:      Blood pressure 122/53, pulse 54, temperature 97.5  F (36.4  C), temperature source Oral, resp. rate 16, height 1.549 m (5' 1\"), weight 45.4 kg (100 lb), SpO2 95 %.  Vitals:    07/18/22 0649   Weight: 45.4 kg (100 lb)     Vital Signs with Ranges  Temp:  [96.7  F (35.9  C)-97.5  F (36.4  C)] 97.5  F (36.4  C)  Pulse:  [45-59] 54  Resp:  [0-17] 16  BP: ()/(48-70) 122/53  SpO2:  [91 %-99 %] 95 %  I/O's Last 24 hours  I/O last 3 completed shifts:  In: 250 [P.O.:250]  Out: -     Constitutional: Alert, awake and oriented X 3; resting comfortably in no apparent distress   HEENT: Pupils equal and reactive to light and accomodation, neck supple    Oral cavity: Moist mucosa   Cardiovascular: Normal s1 s2, regular rate and rhythm, no murmur   Lungs: B/l clear to auscultation, no wheezes or crepitations   Abdomen: Soft, nt, nd, no guarding, rigidity or rebound; BS +   LE : No edema   Musculoskeletal: Power 5/5 in all extremities; surgical site looks clean with no bleed   Neuro: No focal neurological deficits noted   Psychiatry: normal mood and affect                Medications:          acetaminophen  975 mg Oral Q8H     [Held by provider] amLODIPine  2.5 mg Oral QAM     aspirin  81 mg Oral BID     brimonidine  1 drop Both Eyes BID     dorzolamide  1 drop Left Eye Daily     escitalopram  5 mg Oral QAM     [Held by provider] lisinopril  2.5 mg Oral QPM     pantoprazole  40 mg Oral " "Daily     polyethylene glycol  17 g Oral Daily     pravastatin  20 mg Oral At Bedtime     senna-docusate  1 tablet Oral BID     sodium chloride (PF)  3 mL Intracatheter Q8H     PRN Meds: [START ON 7/21/2022] acetaminophen, sore throat lozenge, bisacodyl, bupivacaine liposome (EXPAREL) LA inj was given in the infiltration site to produce post-op analgesia. Duration of action is up to 72 hours. Other \"arielle\" meds should not be given for 96 hours except for lidocaine 4% patch. This is for INFORMATION ONLY., diphenhydrAMINE, HYDROmorphone **OR** HYDROmorphone, hydrOXYzine, lidocaine 4%, lidocaine (buffered or not buffered), lidocaine (buffered or not buffered), magnesium hydroxide, naloxone **OR** naloxone **OR** naloxone **OR** naloxone, ondansetron **OR** ondansetron, polyethylene glycol-propylene glycol, prochlorperazine **OR** prochlorperazine, sodium chloride (PF), sodium chloride (PF), traMADol         Data:      All new lab and imaging data was reviewed.   Recent Labs   Lab Test 03/15/21  1230 09/23/19  0314 04/15/17  1047   WBC 7.7 8.7 6.7   HGB 13.5 13.7 14.5   MCV 97 96 94    233 233      Recent Labs   Lab Test 03/15/21  1230 09/23/19  0314 04/15/17  1047    137 138   POTASSIUM 4.6 3.9 4.0   CHLORIDE 107 105 103   CO2 26 26 27   BUN 31* 35* 30   CR 0.96 1.04 1.05*   ANIONGAP 5 6 8   TIARA 9.9 9.3 9.3   * 135* 110*     Recent Labs   Lab Test 09/23/19  0521 09/23/19  0314 04/15/17  1047   TROPI <0.015 <0.015 <0.015  The 99th percentile for upper reference range is 0.045 ug/L.  Troponin values in   the range of 0.045 - 0.120 ug/L may be associated with risks of adverse   clinical events.          "

## 2022-07-19 NOTE — PLAN OF CARE
OT: Orders received. Chart reviewed and discussed with care team.  OT not indicated due to per chart review and discussion w/ current care team pt has planned discharge to TCU.  Defer discharge recommendations to next level of care, no IP OT needs at this time.  Will complete orders.

## 2022-08-02 ENCOUNTER — TRANSFERRED RECORDS (OUTPATIENT)
Dept: HEALTH INFORMATION MANAGEMENT | Facility: CLINIC | Age: 87
End: 2022-08-02

## 2022-09-13 ENCOUNTER — TRANSFERRED RECORDS (OUTPATIENT)
Dept: HEALTH INFORMATION MANAGEMENT | Facility: CLINIC | Age: 87
End: 2022-09-13

## 2022-11-15 ENCOUNTER — TRANSFERRED RECORDS (OUTPATIENT)
Dept: HEALTH INFORMATION MANAGEMENT | Facility: CLINIC | Age: 87
End: 2022-11-15

## 2023-01-02 ENCOUNTER — TRANSFERRED RECORDS (OUTPATIENT)
Dept: HEALTH INFORMATION MANAGEMENT | Facility: CLINIC | Age: 88
End: 2023-01-02
Payer: MEDICARE

## 2023-01-19 ENCOUNTER — TRANSFERRED RECORDS (OUTPATIENT)
Dept: HEALTH INFORMATION MANAGEMENT | Facility: CLINIC | Age: 88
End: 2023-01-19
Payer: MEDICARE

## 2023-06-27 ENCOUNTER — TRANSFERRED RECORDS (OUTPATIENT)
Dept: HEALTH INFORMATION MANAGEMENT | Facility: CLINIC | Age: 88
End: 2023-06-27
Payer: MEDICARE

## 2023-07-31 ENCOUNTER — TRANSFERRED RECORDS (OUTPATIENT)
Dept: HEALTH INFORMATION MANAGEMENT | Facility: CLINIC | Age: 88
End: 2023-07-31
Payer: MEDICARE

## 2023-07-31 ENCOUNTER — MEDICAL CORRESPONDENCE (OUTPATIENT)
Dept: HEALTH INFORMATION MANAGEMENT | Facility: CLINIC | Age: 88
End: 2023-07-31

## 2023-10-03 ENCOUNTER — ANESTHESIA EVENT (OUTPATIENT)
Dept: SURGERY | Facility: CLINIC | Age: 88
End: 2023-10-03
Payer: MEDICARE

## 2023-10-03 NOTE — PROGRESS NOTES
PTA medications updated by Medication Scribe prior to surgery via phone call with patient (last doses completed by Nurse)     Medication history sources: Patient and H&P  In the past week, patient estimated taking medication this percent of the time: Greater than 90%      Significant changes made to the medication list:  Patient reports no longer taking the following meds (med scribe removed from PTA med list): tylenol 325 mg, keflex, Atarax, Zofran, Tramadol      Additional medication history information:   Patient was advised to bring: Systane, dorzolomide & brimonidine [employer]    Medication reconciliation completed by provider prior to medication history? No    Time spent in this activity: 40 minutes    The information provided in this note is only as accurate as the sources available at the time of update(s)      Prior to Admission medications    Medication Sig Last Dose Taking? Auth Provider Long Term End Date   acetaminophen (TYLENOL) 650 MG CR tablet Take 650 mg by mouth as needed for mild pain or fever Unknown at PRN Yes Reported, Patient     amLODIPine (NORVASC) 2.5 MG tablet Take 2.5 mg by mouth every morning  at AM Yes Reported, Patient Yes    amoxicillin (AMOXIL) 500 MG tablet Take 2,000 mg by mouth once (4 x 500 mg)  Prophylactic prior to dental appointment > 6 months at PRN Yes Reported, Patient     Aspirin 81 MG CAPS Resume usual dose of 81 mg daily after finishing 30 days of increased dose of 81 mg twice a day after surgery 9/26/2023 at PM Yes Jovita Denney PA-C     brimonidine (ALPHAGAN) 0.2 % ophthalmic solution Place 1 drop into both eyes 2 times daily  at AM Yes Reported, Patient     Calcium Carb-Cholecalciferol (CALCIUM CARBONATE-VITAMIN D3) 600-400 MG-UNIT TABS Take 1 tablet by mouth every morning 9/26/2023 at AM Yes Reported, Patient     dorzolamide (TRUSOPT) 2 % ophthalmic solution Place 1 drop Into the left eye daily  at AM Yes Reported, Patient     escitalopram (LEXAPRO) 5 MG tablet  Take 5 mg by mouth every morning  at AM Yes Reported, Patient Yes    lisinopril (ZESTRIL) 2.5 MG tablet Take 2.5 mg by mouth every evening  at PM Yes Reported, Patient Yes    Multiple Vitamins-Minerals (MULTIVITAMIN ADULTS 50+ PO) Take 1 tablet by mouth daily 9/26/2023 at AM Yes Reported, Patient No    omeprazole (PRILOSEC) 20 MG capsule Take 20 mg by mouth daily ( Before a meal)  at AM Yes Reported, Patient     polyethylene glycol (MIRALAX) 17 g packet Take 17 g by mouth daily Unknown at PRN Yes Jovita Denney PA-C     polyethylene glycol-propylene glycol (SYSTANE ULTRA) 0.4-0.3 % SOLN ophthalmic solution Place 1 drop into both eyes 2 times daily as needed for dry eyes  at AM Yes Reported, Patient     pravastatin (PRAVACHOL) 20 MG tablet Take 20 mg by mouth At Bedtime  at PM Yes Reported, Patient     senna-docusate (SENOKOT-S/PERICOLACE) 8.6-50 MG tablet Take 1 tablet by mouth 2 times daily as needed for constipation Take while on oral narcotics to prevent or treat constipation. Unknown at PRN Yes Jovita Denney PA-C

## 2023-10-03 NOTE — ANESTHESIA PREPROCEDURE EVALUATION
Anesthesia Pre-Procedure Evaluation    Patient: Susy Chavarria   MRN: [id number] : 1932        Procedure : Procedure(s):  RIGHT SHOULDER REVERSE TOTAL SHOULDER ARTHROPLASTY          Past Medical History:   Diagnosis Date    Acquired absence of kidney     Anxiety     Arthritis     Gastroesophageal reflux disease     High cholesterol     Hypertension     PAD (peripheral artery disease) (H24)     Pre-diabetes       Past Surgical History:   Procedure Laterality Date    ARTHROPLASTY HIP Left 2022    Procedure: LEFT TOTAL HIP ARTHROPLASTY;  Surgeon: Fortino Zapien MD;  Location:  OR    cataracts      bilateral    COLONOSCOPY      COLONOSCOPY  10/21/2011    Procedure:COLONOSCOPY; COLONOSCOPY ; Surgeon:ALEX VALDEZ; Location: GI    EYE SURGERY      JOINT REPLACEMENT Left     L knee    JOINT REPLACEMENT Right     R hip    NEPHRECTOMY      donor    RELEASE CARPAL TUNNEL      right wrist      No Known Allergies   Social History     Tobacco Use    Smoking status: Never    Smokeless tobacco: Never   Substance Use Topics    Alcohol use: Yes     Comment: 1 drink weekly      Wt Readings from Last 1 Encounters:   22 45.4 kg (100 lb)        Prior to Admission medications    Medication Sig Start Date End Date Taking? Authorizing Provider   acetaminophen (TYLENOL) 650 MG CR tablet Take 650 mg by mouth as needed for mild pain or fever   Yes Reported, Patient   amLODIPine (NORVASC) 2.5 MG tablet Take 2.5 mg by mouth every morning   Yes Reported, Patient   amoxicillin (AMOXIL) 500 MG tablet Take 2,000 mg by mouth once (4 x 500 mg)  Prophylactic prior to dental appointment   Yes Reported, Patient   Aspirin 81 MG CAPS Resume usual dose of 81 mg daily after finishing 30 days of increased dose of 81 mg twice a day after surgery 22  Yes Jovita Denney PA-C   brimonidine (ALPHAGAN) 0.2 % ophthalmic solution Place 1 drop into both eyes 2 times daily   Yes Reported, Patient   Calcium Carb-Cholecalciferol  (CALCIUM CARBONATE-VITAMIN D3) 600-400 MG-UNIT TABS Take 1 tablet by mouth every morning   Yes Reported, Patient   dorzolamide (TRUSOPT) 2 % ophthalmic solution Place 1 drop Into the left eye daily   Yes Reported, Patient   escitalopram (LEXAPRO) 5 MG tablet Take 5 mg by mouth every morning   Yes Reported, Patient   lisinopril (ZESTRIL) 2.5 MG tablet Take 2.5 mg by mouth every evening   Yes Reported, Patient   Multiple Vitamins-Minerals (MULTIVITAMIN ADULTS 50+ PO) Take 1 tablet by mouth daily   Yes Reported, Patient   omeprazole (PRILOSEC) 20 MG capsule Take 20 mg by mouth daily ( Before a meal)   Yes Reported, Patient   polyethylene glycol (MIRALAX) 17 g packet Take 17 g by mouth daily 7/19/22  Yes Jovita Denney PA-C   polyethylene glycol-propylene glycol (SYSTANE ULTRA) 0.4-0.3 % SOLN ophthalmic solution Place 1 drop into both eyes 2 times daily as needed for dry eyes   Yes Reported, Patient   pravastatin (PRAVACHOL) 20 MG tablet Take 20 mg by mouth At Bedtime   Yes Reported, Patient   senna-docusate (SENOKOT-S/PERICOLACE) 8.6-50 MG tablet Take 1 tablet by mouth 2 times daily as needed for constipation Take while on oral narcotics to prevent or treat constipation. 7/19/22  Yes Jovita Denney PA-C     ECG 7/20/22: SB    Anesthesia Evaluation    Type: General.        ROS/MED HX  ENT/Pulmonary:    (-) tobacco use, asthma and sleep apnea   Neurologic:    (-) no seizures, no CVA and migraines   Cardiovascular:     (+) Dyslipidemia hypertension- Peripheral Vascular Disease-   -  - -                                   (-) CAD, CLARK, arrhythmias, valvular problems/murmurs and murmur   METS/Exercise Tolerance:     Hematologic:    (-) history of blood clots and anemia   Musculoskeletal:   (+)  arthritis,             GI/Hepatic:     (+) GERD,                (-) liver disease   Renal/Genitourinary:     (+) renal disease, type: CRI,         (-) nephrolithiasis   Endo:    (-) Type I DM, Type II DM, thyroid disease and obesity    Psychiatric/Substance Use:     (+) psychiatric history anxiety       Infectious Disease:    (-) Recent Fever   Malignancy:       Other:            Physical Exam    Airway        Mallampati: III   TM distance: > 3 FB   Neck ROM: full   Mouth opening: > 3 cm    Respiratory Devices and Support         Dental       (+) Completely normal teeth      Cardiovascular   cardiovascular exam normal       Rhythm and rate: regular and normal (-) no murmur    Pulmonary   pulmonary exam normal        breath sounds clear to auscultation           OUTSIDE LABS:  CBC:   Lab Results   Component Value Date    WBC 7.7 03/15/2021    WBC 8.7 09/23/2019    HGB 12.4 07/19/2022    HGB 13.5 03/15/2021    HCT 40.4 03/15/2021    HCT 39.7 09/23/2019     03/15/2021     09/23/2019     BMP:   Lab Results   Component Value Date     07/19/2022     03/15/2021    POTASSIUM 4.3 07/19/2022    POTASSIUM 4.6 03/15/2021    CHLORIDE 107 07/19/2022    CHLORIDE 107 03/15/2021    CO2 24 07/19/2022    CO2 26 03/15/2021    BUN 30 07/19/2022    BUN 31 (H) 03/15/2021    CR 1.12 (H) 07/19/2022    CR 0.96 03/15/2021     (H) 07/19/2022     (H) 07/19/2022     COAGS:   Lab Results   Component Value Date    PTT 26 01/12/2008    INR 1.46 (H) 01/16/2008     POC: No results found for: BGM, HCG, HCGS  HEPATIC:   Lab Results   Component Value Date    ALBUMIN 3.8 09/23/2019    PROTTOTAL 7.0 09/23/2019    ALT 23 09/23/2019    AST 25 09/23/2019    ALKPHOS 67 09/23/2019    BILITOTAL 0.5 09/23/2019     OTHER:   Lab Results   Component Value Date    TIARA 8.9 07/19/2022       Anesthesia Plan    ASA Status:  3    NPO Status:  NPO Appropriate    Anesthesia Type: General.     - Airway: ETT   Induction: Propofol.   Maintenance: Balanced.        Consents    Anesthesia Plan(s) and associated risks, benefits, and realistic alternatives discussed. Questions answered and patient/representative(s) expressed understanding.     - Discussed:     -  Discussed with:  Patient            Postoperative Care    Pain management: Peripheral nerve block (Single Shot).   PONV prophylaxis: Ondansetron (or other 5HT-3), Dexamethasone or Solumedrol, Background Propofol Infusion     Comments:                Chetna Murillo MD

## 2023-10-04 ENCOUNTER — APPOINTMENT (OUTPATIENT)
Dept: GENERAL RADIOLOGY | Facility: CLINIC | Age: 88
End: 2023-10-04
Attending: PHYSICIAN ASSISTANT
Payer: MEDICARE

## 2023-10-04 ENCOUNTER — HOSPITAL ENCOUNTER (OUTPATIENT)
Facility: CLINIC | Age: 88
Discharge: HOME | End: 2023-10-05
Attending: ORTHOPAEDIC SURGERY | Admitting: ORTHOPAEDIC SURGERY
Payer: MEDICARE

## 2023-10-04 ENCOUNTER — ANESTHESIA (OUTPATIENT)
Dept: SURGERY | Facility: CLINIC | Age: 88
End: 2023-10-04
Payer: MEDICARE

## 2023-10-04 DIAGNOSIS — Z96.611 S/P REVERSE TOTAL SHOULDER ARTHROPLASTY, RIGHT: Primary | ICD-10-CM

## 2023-10-04 PROBLEM — Z96.619 S/P REVERSE TOTAL SHOULDER ARTHROPLASTY: Status: ACTIVE | Noted: 2023-10-04

## 2023-10-04 LAB
CREAT SERPL-MCNC: 1.09 MG/DL (ref 0.51–0.95)
EGFRCR SERPLBLD CKD-EPI 2021: 48 ML/MIN/1.73M2
FASTING STATUS PATIENT QL REPORTED: YES
GLUCOSE SERPL-MCNC: 137 MG/DL (ref 70–99)
POTASSIUM SERPL-SCNC: 4.2 MMOL/L (ref 3.4–5.3)

## 2023-10-04 PROCEDURE — 258N000003 HC RX IP 258 OP 636: Performed by: NURSE ANESTHETIST, CERTIFIED REGISTERED

## 2023-10-04 PROCEDURE — 250N000009 HC RX 250: Performed by: NURSE ANESTHETIST, CERTIFIED REGISTERED

## 2023-10-04 PROCEDURE — L3670 SO ACRO/CLAV CAN WEB PRE OTS: HCPCS

## 2023-10-04 PROCEDURE — 250N000009 HC RX 250: Performed by: ORTHOPAEDIC SURGERY

## 2023-10-04 PROCEDURE — 250N000011 HC RX IP 250 OP 636: Performed by: ANESTHESIOLOGY

## 2023-10-04 PROCEDURE — 999N000141 HC STATISTIC PRE-PROCEDURE NURSING ASSESSMENT: Performed by: ORTHOPAEDIC SURGERY

## 2023-10-04 PROCEDURE — 710N000009 HC RECOVERY PHASE 1, LEVEL 1, PER MIN: Performed by: ORTHOPAEDIC SURGERY

## 2023-10-04 PROCEDURE — 250N000025 HC SEVOFLURANE, PER MIN: Performed by: ORTHOPAEDIC SURGERY

## 2023-10-04 PROCEDURE — 82565 ASSAY OF CREATININE: CPT | Performed by: ORTHOPAEDIC SURGERY

## 2023-10-04 PROCEDURE — 250N000011 HC RX IP 250 OP 636: Performed by: PHYSICIAN ASSISTANT

## 2023-10-04 PROCEDURE — 250N000013 HC RX MED GY IP 250 OP 250 PS 637: Performed by: PHYSICIAN ASSISTANT

## 2023-10-04 PROCEDURE — 36415 COLL VENOUS BLD VENIPUNCTURE: CPT | Performed by: ANESTHESIOLOGY

## 2023-10-04 PROCEDURE — 250N000011 HC RX IP 250 OP 636: Performed by: NURSE ANESTHETIST, CERTIFIED REGISTERED

## 2023-10-04 PROCEDURE — 258N000001 HC RX 258: Performed by: ORTHOPAEDIC SURGERY

## 2023-10-04 PROCEDURE — C1776 JOINT DEVICE (IMPLANTABLE): HCPCS | Performed by: ORTHOPAEDIC SURGERY

## 2023-10-04 PROCEDURE — 370N000017 HC ANESTHESIA TECHNICAL FEE, PER MIN: Performed by: ORTHOPAEDIC SURGERY

## 2023-10-04 PROCEDURE — 84132 ASSAY OF SERUM POTASSIUM: CPT | Performed by: ANESTHESIOLOGY

## 2023-10-04 PROCEDURE — 250N000009 HC RX 250: Performed by: ANESTHESIOLOGY

## 2023-10-04 PROCEDURE — 360N000077 HC SURGERY LEVEL 4, PER MIN: Performed by: ORTHOPAEDIC SURGERY

## 2023-10-04 PROCEDURE — 272N000001 HC OR GENERAL SUPPLY STERILE: Performed by: ORTHOPAEDIC SURGERY

## 2023-10-04 PROCEDURE — C1713 ANCHOR/SCREW BN/BN,TIS/BN: HCPCS | Performed by: ORTHOPAEDIC SURGERY

## 2023-10-04 PROCEDURE — C9290 INJ, BUPIVACAINE LIPOSOME: HCPCS | Performed by: ANESTHESIOLOGY

## 2023-10-04 PROCEDURE — 999N000063 XR SHOULDER RIGHT PORT G/E 2 VIEWS: Mod: RT

## 2023-10-04 PROCEDURE — 258N000003 HC RX IP 258 OP 636: Performed by: ANESTHESIOLOGY

## 2023-10-04 PROCEDURE — 82947 ASSAY GLUCOSE BLOOD QUANT: CPT | Performed by: ANESTHESIOLOGY

## 2023-10-04 PROCEDURE — 258N000003 HC RX IP 258 OP 636: Performed by: PHYSICIAN ASSISTANT

## 2023-10-04 DEVICE — SCREW PERIPHERAL 22MM: Type: IMPLANTABLE DEVICE | Site: SHOULDER | Status: FUNCTIONAL

## 2023-10-04 DEVICE — SCREW CENTRAL 6.5X30MM DWJ130: Type: IMPLANTABLE DEVICE | Site: SHOULDER | Status: FUNCTIONAL

## 2023-10-04 DEVICE — SCREW PERIPHERAL 5.0X30MM DWJ330: Type: IMPLANTABLE DEVICE | Site: SHOULDER | Status: FUNCTIONAL

## 2023-10-04 DEVICE — IMPLANTABLE DEVICE
Type: IMPLANTABLE DEVICE | Site: SHOULDER | Status: FUNCTIONAL
Brand: TORNIER PERFORM™ HUMERAL SYSTEM

## 2023-10-04 DEVICE — IMPLANTABLE DEVICE
Type: IMPLANTABLE DEVICE | Site: SHOULDER | Status: FUNCTIONAL
Brand: TORNIER PERFORM® REVERSED GLENOID

## 2023-10-04 DEVICE — IMPLANTABLE DEVICE
Type: IMPLANTABLE DEVICE | Site: SHOULDER | Status: FUNCTIONAL
Brand: TORNIER PERFORM® REVERSED AUGMENTED GLENOID

## 2023-10-04 RX ORDER — NALOXONE HYDROCHLORIDE 0.4 MG/ML
0.4 INJECTION, SOLUTION INTRAMUSCULAR; INTRAVENOUS; SUBCUTANEOUS
Status: DISCONTINUED | OUTPATIENT
Start: 2023-10-04 | End: 2023-10-05 | Stop reason: HOSPADM

## 2023-10-04 RX ORDER — TRAMADOL HYDROCHLORIDE 50 MG/1
50 TABLET ORAL EVERY 6 HOURS PRN
Status: DISCONTINUED | OUTPATIENT
Start: 2023-10-04 | End: 2023-10-05 | Stop reason: HOSPADM

## 2023-10-04 RX ORDER — PRAVASTATIN SODIUM 20 MG
20 TABLET ORAL AT BEDTIME
Status: DISCONTINUED | OUTPATIENT
Start: 2023-10-04 | End: 2023-10-05 | Stop reason: HOSPADM

## 2023-10-04 RX ORDER — ASPIRIN 81 MG/1
81 TABLET ORAL 2 TIMES DAILY
Qty: 60 TABLET | Refills: 0 | Status: SHIPPED | OUTPATIENT
Start: 2023-10-04

## 2023-10-04 RX ORDER — POLYETHYLENE GLYCOL 3350 17 G/17G
17 POWDER, FOR SOLUTION ORAL DAILY
Status: DISCONTINUED | OUTPATIENT
Start: 2023-10-05 | End: 2023-10-05 | Stop reason: HOSPADM

## 2023-10-04 RX ORDER — LIDOCAINE 40 MG/G
CREAM TOPICAL
Status: DISCONTINUED | OUTPATIENT
Start: 2023-10-04 | End: 2023-10-04 | Stop reason: HOSPADM

## 2023-10-04 RX ORDER — ACETAMINOPHEN 325 MG/1
975 TABLET ORAL EVERY 8 HOURS
Status: DISCONTINUED | OUTPATIENT
Start: 2023-10-04 | End: 2023-10-05 | Stop reason: HOSPADM

## 2023-10-04 RX ORDER — SODIUM CHLORIDE, SODIUM LACTATE, POTASSIUM CHLORIDE, CALCIUM CHLORIDE 600; 310; 30; 20 MG/100ML; MG/100ML; MG/100ML; MG/100ML
INJECTION, SOLUTION INTRAVENOUS CONTINUOUS
Status: DISCONTINUED | OUTPATIENT
Start: 2023-10-04 | End: 2023-10-04 | Stop reason: HOSPADM

## 2023-10-04 RX ORDER — ASPIRIN 81 MG/1
81 TABLET ORAL 2 TIMES DAILY
Status: DISCONTINUED | OUTPATIENT
Start: 2023-10-04 | End: 2023-10-05 | Stop reason: HOSPADM

## 2023-10-04 RX ORDER — AMLODIPINE BESYLATE 2.5 MG/1
2.5 TABLET ORAL EVERY MORNING
Status: DISCONTINUED | OUTPATIENT
Start: 2023-10-05 | End: 2023-10-05 | Stop reason: HOSPADM

## 2023-10-04 RX ORDER — SODIUM CHLORIDE, SODIUM LACTATE, POTASSIUM CHLORIDE, CALCIUM CHLORIDE 600; 310; 30; 20 MG/100ML; MG/100ML; MG/100ML; MG/100ML
INJECTION, SOLUTION INTRAVENOUS CONTINUOUS
Status: DISCONTINUED | OUTPATIENT
Start: 2023-10-04 | End: 2023-10-05 | Stop reason: HOSPADM

## 2023-10-04 RX ORDER — ONDANSETRON 2 MG/ML
INJECTION INTRAMUSCULAR; INTRAVENOUS PRN
Status: DISCONTINUED | OUTPATIENT
Start: 2023-10-04 | End: 2023-10-04

## 2023-10-04 RX ORDER — AMOXICILLIN 250 MG
1-2 CAPSULE ORAL 2 TIMES DAILY
Qty: 30 TABLET | Refills: 0 | Status: SHIPPED | OUTPATIENT
Start: 2023-10-04

## 2023-10-04 RX ORDER — ONDANSETRON 2 MG/ML
4 INJECTION INTRAMUSCULAR; INTRAVENOUS EVERY 6 HOURS PRN
Status: DISCONTINUED | OUTPATIENT
Start: 2023-10-04 | End: 2023-10-05 | Stop reason: HOSPADM

## 2023-10-04 RX ORDER — TRAMADOL HYDROCHLORIDE 50 MG/1
50 TABLET ORAL EVERY 6 HOURS PRN
Qty: 25 TABLET | Refills: 0 | Status: SHIPPED | OUTPATIENT
Start: 2023-10-04

## 2023-10-04 RX ORDER — ESCITALOPRAM OXALATE 5 MG/1
5 TABLET ORAL EVERY MORNING
Status: DISCONTINUED | OUTPATIENT
Start: 2023-10-05 | End: 2023-10-05 | Stop reason: HOSPADM

## 2023-10-04 RX ORDER — BISACODYL 10 MG
10 SUPPOSITORY, RECTAL RECTAL DAILY PRN
Status: DISCONTINUED | OUTPATIENT
Start: 2023-10-04 | End: 2023-10-05 | Stop reason: HOSPADM

## 2023-10-04 RX ORDER — LIDOCAINE 40 MG/G
CREAM TOPICAL
Status: DISCONTINUED | OUTPATIENT
Start: 2023-10-04 | End: 2023-10-05 | Stop reason: HOSPADM

## 2023-10-04 RX ORDER — CEFAZOLIN SODIUM/WATER 2 G/20 ML
2 SYRINGE (ML) INTRAVENOUS
Status: COMPLETED | OUTPATIENT
Start: 2023-10-04 | End: 2023-10-04

## 2023-10-04 RX ORDER — ACETAMINOPHEN 325 MG/1
650 TABLET ORAL EVERY 4 HOURS PRN
Qty: 100 TABLET | Refills: 0 | Status: SHIPPED | OUTPATIENT
Start: 2023-10-04

## 2023-10-04 RX ORDER — ONDANSETRON 4 MG/1
4 TABLET, ORALLY DISINTEGRATING ORAL EVERY 30 MIN PRN
Status: DISCONTINUED | OUTPATIENT
Start: 2023-10-04 | End: 2023-10-04 | Stop reason: HOSPADM

## 2023-10-04 RX ORDER — DORZOLAMIDE HCL 20 MG/ML
1 SOLUTION/ DROPS OPHTHALMIC 2 TIMES DAILY
Status: DISCONTINUED | OUTPATIENT
Start: 2023-10-04 | End: 2023-10-05 | Stop reason: HOSPADM

## 2023-10-04 RX ORDER — CEFAZOLIN SODIUM/WATER 2 G/20 ML
2 SYRINGE (ML) INTRAVENOUS SEE ADMIN INSTRUCTIONS
Status: DISCONTINUED | OUTPATIENT
Start: 2023-10-04 | End: 2023-10-04 | Stop reason: HOSPADM

## 2023-10-04 RX ORDER — BUPIVACAINE HYDROCHLORIDE AND EPINEPHRINE 2.5; 5 MG/ML; UG/ML
INJECTION, SOLUTION EPIDURAL; INFILTRATION; INTRACAUDAL; PERINEURAL
Status: DISCONTINUED
Start: 2023-10-04 | End: 2023-10-04 | Stop reason: HOSPADM

## 2023-10-04 RX ORDER — MAGNESIUM HYDROXIDE 1200 MG/15ML
LIQUID ORAL PRN
Status: DISCONTINUED | OUTPATIENT
Start: 2023-10-04 | End: 2023-10-04 | Stop reason: HOSPADM

## 2023-10-04 RX ORDER — PROCHLORPERAZINE MALEATE 5 MG
5 TABLET ORAL EVERY 6 HOURS PRN
Status: DISCONTINUED | OUTPATIENT
Start: 2023-10-04 | End: 2023-10-05 | Stop reason: HOSPADM

## 2023-10-04 RX ORDER — LIDOCAINE HYDROCHLORIDE 20 MG/ML
INJECTION, SOLUTION INFILTRATION; PERINEURAL PRN
Status: DISCONTINUED | OUTPATIENT
Start: 2023-10-04 | End: 2023-10-04

## 2023-10-04 RX ORDER — CEFAZOLIN SODIUM 1 G/3ML
1 INJECTION, POWDER, FOR SOLUTION INTRAMUSCULAR; INTRAVENOUS EVERY 8 HOURS
Status: COMPLETED | OUTPATIENT
Start: 2023-10-04 | End: 2023-10-05

## 2023-10-04 RX ORDER — PROPOFOL 10 MG/ML
INJECTION, EMULSION INTRAVENOUS PRN
Status: DISCONTINUED | OUTPATIENT
Start: 2023-10-04 | End: 2023-10-04

## 2023-10-04 RX ORDER — LISINOPRIL 2.5 MG/1
2.5 TABLET ORAL EVERY EVENING
Status: DISCONTINUED | OUTPATIENT
Start: 2023-10-04 | End: 2023-10-05 | Stop reason: HOSPADM

## 2023-10-04 RX ORDER — BRIMONIDINE TARTRATE 2 MG/ML
1 SOLUTION/ DROPS OPHTHALMIC 2 TIMES DAILY
Status: DISCONTINUED | OUTPATIENT
Start: 2023-10-04 | End: 2023-10-05 | Stop reason: HOSPADM

## 2023-10-04 RX ORDER — FENTANYL CITRATE 0.05 MG/ML
25 INJECTION, SOLUTION INTRAMUSCULAR; INTRAVENOUS EVERY 5 MIN PRN
Status: DISCONTINUED | OUTPATIENT
Start: 2023-10-04 | End: 2023-10-04 | Stop reason: HOSPADM

## 2023-10-04 RX ORDER — HYDROMORPHONE HCL IN WATER/PF 6 MG/30 ML
0.2 PATIENT CONTROLLED ANALGESIA SYRINGE INTRAVENOUS EVERY 5 MIN PRN
Status: DISCONTINUED | OUTPATIENT
Start: 2023-10-04 | End: 2023-10-04 | Stop reason: HOSPADM

## 2023-10-04 RX ORDER — FENTANYL CITRATE 0.05 MG/ML
50 INJECTION, SOLUTION INTRAMUSCULAR; INTRAVENOUS
Status: DISCONTINUED | OUTPATIENT
Start: 2023-10-04 | End: 2023-10-04 | Stop reason: HOSPADM

## 2023-10-04 RX ORDER — AMOXICILLIN 250 MG
1 CAPSULE ORAL 2 TIMES DAILY
Status: DISCONTINUED | OUTPATIENT
Start: 2023-10-04 | End: 2023-10-05 | Stop reason: HOSPADM

## 2023-10-04 RX ORDER — TRANEXAMIC ACID 650 MG/1
1950 TABLET ORAL ONCE
Status: COMPLETED | OUTPATIENT
Start: 2023-10-04 | End: 2023-10-04

## 2023-10-04 RX ORDER — FENTANYL CITRATE 0.05 MG/ML
50 INJECTION, SOLUTION INTRAMUSCULAR; INTRAVENOUS EVERY 5 MIN PRN
Status: DISCONTINUED | OUTPATIENT
Start: 2023-10-04 | End: 2023-10-04 | Stop reason: HOSPADM

## 2023-10-04 RX ORDER — HYDROMORPHONE HCL IN WATER/PF 6 MG/30 ML
0.4 PATIENT CONTROLLED ANALGESIA SYRINGE INTRAVENOUS EVERY 5 MIN PRN
Status: DISCONTINUED | OUTPATIENT
Start: 2023-10-04 | End: 2023-10-04 | Stop reason: HOSPADM

## 2023-10-04 RX ORDER — HYDROMORPHONE HCL IN WATER/PF 6 MG/30 ML
0.4 PATIENT CONTROLLED ANALGESIA SYRINGE INTRAVENOUS
Status: DISCONTINUED | OUTPATIENT
Start: 2023-10-04 | End: 2023-10-05 | Stop reason: HOSPADM

## 2023-10-04 RX ORDER — ACETAMINOPHEN 325 MG/1
650 TABLET ORAL EVERY 4 HOURS PRN
Status: DISCONTINUED | OUTPATIENT
Start: 2023-10-07 | End: 2023-10-05 | Stop reason: HOSPADM

## 2023-10-04 RX ORDER — DEXAMETHASONE SODIUM PHOSPHATE 4 MG/ML
INJECTION, SOLUTION INTRA-ARTICULAR; INTRALESIONAL; INTRAMUSCULAR; INTRAVENOUS; SOFT TISSUE PRN
Status: DISCONTINUED | OUTPATIENT
Start: 2023-10-04 | End: 2023-10-04

## 2023-10-04 RX ORDER — NALOXONE HYDROCHLORIDE 0.4 MG/ML
0.2 INJECTION, SOLUTION INTRAMUSCULAR; INTRAVENOUS; SUBCUTANEOUS
Status: DISCONTINUED | OUTPATIENT
Start: 2023-10-04 | End: 2023-10-05 | Stop reason: HOSPADM

## 2023-10-04 RX ORDER — ONDANSETRON 4 MG/1
4 TABLET, ORALLY DISINTEGRATING ORAL EVERY 6 HOURS PRN
Status: DISCONTINUED | OUTPATIENT
Start: 2023-10-04 | End: 2023-10-05 | Stop reason: HOSPADM

## 2023-10-04 RX ORDER — HYDROMORPHONE HCL IN WATER/PF 6 MG/30 ML
0.2 PATIENT CONTROLLED ANALGESIA SYRINGE INTRAVENOUS
Status: DISCONTINUED | OUTPATIENT
Start: 2023-10-04 | End: 2023-10-05 | Stop reason: HOSPADM

## 2023-10-04 RX ORDER — ONDANSETRON 2 MG/ML
4 INJECTION INTRAMUSCULAR; INTRAVENOUS EVERY 30 MIN PRN
Status: DISCONTINUED | OUTPATIENT
Start: 2023-10-04 | End: 2023-10-04 | Stop reason: HOSPADM

## 2023-10-04 RX ADMIN — CEFAZOLIN 1 G: 1 INJECTION, POWDER, FOR SOLUTION INTRAMUSCULAR; INTRAVENOUS at 17:41

## 2023-10-04 RX ADMIN — ACETAMINOPHEN 975 MG: 325 TABLET, FILM COATED ORAL at 20:32

## 2023-10-04 RX ADMIN — ONDANSETRON 4 MG: 2 INJECTION INTRAMUSCULAR; INTRAVENOUS at 11:01

## 2023-10-04 RX ADMIN — DEXAMETHASONE SODIUM PHOSPHATE 10 MG: 4 INJECTION, SOLUTION INTRA-ARTICULAR; INTRALESIONAL; INTRAMUSCULAR; INTRAVENOUS; SOFT TISSUE at 10:19

## 2023-10-04 RX ADMIN — BRIMONIDINE TARTRATE 1 DROP: 2 SOLUTION/ DROPS OPHTHALMIC at 20:32

## 2023-10-04 RX ADMIN — SUGAMMADEX 200 MG: 100 INJECTION, SOLUTION INTRAVENOUS at 11:12

## 2023-10-04 RX ADMIN — PHENYLEPHRINE HYDROCHLORIDE 100 MCG: 10 INJECTION INTRAVENOUS at 10:00

## 2023-10-04 RX ADMIN — BUPIVACAINE 10 ML: 13.3 INJECTION, SUSPENSION, LIPOSOMAL INFILTRATION at 08:32

## 2023-10-04 RX ADMIN — TRANEXAMIC ACID 1950 MG: 650 TABLET ORAL at 07:40

## 2023-10-04 RX ADMIN — SODIUM CHLORIDE, POTASSIUM CHLORIDE, SODIUM LACTATE AND CALCIUM CHLORIDE: 600; 310; 30; 20 INJECTION, SOLUTION INTRAVENOUS at 09:07

## 2023-10-04 RX ADMIN — ROCURONIUM BROMIDE 30 MG: 50 INJECTION, SOLUTION INTRAVENOUS at 09:45

## 2023-10-04 RX ADMIN — Medication 2 G: at 09:46

## 2023-10-04 RX ADMIN — ACETAMINOPHEN 975 MG: 325 TABLET, FILM COATED ORAL at 13:47

## 2023-10-04 RX ADMIN — PRAVASTATIN SODIUM 20 MG: 20 TABLET ORAL at 21:55

## 2023-10-04 RX ADMIN — LIDOCAINE HYDROCHLORIDE 60 MG: 20 INJECTION, SOLUTION INFILTRATION; PERINEURAL at 09:45

## 2023-10-04 RX ADMIN — ASPIRIN 81 MG: 81 TABLET, COATED ORAL at 20:32

## 2023-10-04 RX ADMIN — SENNOSIDES AND DOCUSATE SODIUM 1 TABLET: 50; 8.6 TABLET ORAL at 20:32

## 2023-10-04 RX ADMIN — DORZOLAMIDE HCL 1 DROP: 20 SOLUTION/ DROPS OPHTHALMIC at 20:32

## 2023-10-04 RX ADMIN — PHENYLEPHRINE HYDROCHLORIDE 0.4 MCG/KG/MIN: 10 INJECTION INTRAVENOUS at 10:01

## 2023-10-04 RX ADMIN — PHENYLEPHRINE HYDROCHLORIDE 100 MCG: 10 INJECTION INTRAVENOUS at 09:46

## 2023-10-04 RX ADMIN — FENTANYL CITRATE 50 MCG: 50 INJECTION, SOLUTION INTRAMUSCULAR; INTRAVENOUS at 09:06

## 2023-10-04 RX ADMIN — PROPOFOL 100 MG: 10 INJECTION, EMULSION INTRAVENOUS at 09:45

## 2023-10-04 RX ADMIN — SODIUM CHLORIDE, POTASSIUM CHLORIDE, SODIUM LACTATE AND CALCIUM CHLORIDE: 600; 310; 30; 20 INJECTION, SOLUTION INTRAVENOUS at 15:54

## 2023-10-04 RX ADMIN — LISINOPRIL 2.5 MG: 2.5 TABLET ORAL at 20:32

## 2023-10-04 RX ADMIN — BUPIVACAINE HYDROCHLORIDE 10 ML: 5 INJECTION, SOLUTION EPIDURAL; INTRACAUDAL at 08:32

## 2023-10-04 ASSESSMENT — ACTIVITIES OF DAILY LIVING (ADL)
ADLS_ACUITY_SCORE: 18

## 2023-10-04 ASSESSMENT — LIFESTYLE VARIABLES: TOBACCO_USE: 0

## 2023-10-04 ASSESSMENT — ENCOUNTER SYMPTOMS
SEIZURES: 0
DYSRHYTHMIAS: 0

## 2023-10-04 NOTE — PROGRESS NOTES
Date/Time 10/4 mera    Trauma/Ortho/Medical (Choose one) ortho    Diagnosis:right reverse total shoulder  POD#:DOS  Mental Status:a&o  Activity/dangleup with SBA/belt and cane  Diet:regular  Pain:denies  Delatorre/Voiding:bathroom  Tele/Restraints/Iso:no  02/LDA:oximeter, IVF until 0400  D/C Date:?  Other Info:sling with abductor pillow, bruise to left clavicle and upper arm.

## 2023-10-04 NOTE — ANESTHESIA CARE TRANSFER NOTE
Patient: Susy Chavarria    Procedure: Procedure(s):  RIGHT SHOULDER REVERSE TOTAL SHOULDER ARTHROPLASTY       Diagnosis: Complete rotator cuff tear or rupture of right shoulder, not specified as traumatic [M75.121]  Diagnosis Additional Information: No value filed.    Anesthesia Type:   General     Note:    Oropharynx: oropharynx clear of all foreign objects and spontaneously breathing  Level of Consciousness: awake  Oxygen Supplementation: face mask  Level of Supplemental Oxygen (L/min / FiO2): 6  Independent Airway: airway patency satisfactory and stable  Dentition: dentition unchanged  Vital Signs Stable: post-procedure vital signs reviewed and stable  Report to RN Given: handoff report given  Patient transferred to: PACU    Handoff Report: Identifed the Patient, Identified the Reponsible Provider, Reviewed the pertinent medical history, Discussed the surgical course, Reviewed Intra-OP anesthesia mangement and issues during anesthesia, Set expectations for post-procedure period and Allowed opportunity for questions and acknowledgement of understanding    Vitals:  Vitals Value Taken Time   BP     Temp     Pulse 64 10/04/23 1129   Resp 12 10/04/23 1129   SpO2 100 % 10/04/23 1129   Vitals shown include unvalidated device data.    Electronically Signed By: IVIS Salter CRNA  October 4, 2023  11:30 AM

## 2023-10-04 NOTE — INTERVAL H&P NOTE
"I have reviewed the surgical (or preoperative) H&P that is linked to this encounter, and examined the patient. There are no significant changes    Clinical Conditions Present on Arrival:  Clinically Significant Risk Factors Present on Admission                 # Drug Induced Platelet Defect: home medication list includes an antiplatelet medication  # Cachexia: Estimated body mass index is 17.91 kg/m  as calculated from the following:    Height as of this encounter: 1.6 m (5' 3\").    Weight as of this encounter: 45.9 kg (101 lb 1.6 oz).       "

## 2023-10-04 NOTE — ANESTHESIA POSTPROCEDURE EVALUATION
Patient: Susy Chavarria    Procedure: Procedure(s):  RIGHT SHOULDER REVERSE TOTAL SHOULDER ARTHROPLASTY       Anesthesia Type:  General    Note:  Disposition: Admission   Postop Pain Control: Uneventful            Sign Out: Well controlled pain   PONV: No   Neuro/Psych: Uneventful            Sign Out: Acceptable/Baseline neuro status   Airway/Respiratory: Uneventful            Sign Out: Acceptable/Baseline resp. status   CV/Hemodynamics: Uneventful            Sign Out: Acceptable CV status; No obvious hypovolemia; No obvious fluid overload   Other NRE: NONE   DID A NON-ROUTINE EVENT OCCUR? No           Last vitals:  Vitals Value Taken Time   /74 10/04/23 1233   Temp     Pulse 54 10/04/23 1240   Resp 8 10/04/23 1240   SpO2 98 % 10/04/23 1240   Vitals shown include unvalidated device data.    Electronically Signed By: Chetna Murillo MD  October 4, 2023  12:41 PM

## 2023-10-04 NOTE — PROGRESS NOTES
Patient vital signs are at baseline: No,  Reason:  BP elevated, on 2L NC  Patient able to ambulate as they were prior to admission or with assist devices provided by therapies during their stay:  No,  Reason:  Not out of bed yet  Patient MUST void prior to discharge:  No,  Reason:  Due to void  Patient able to tolerate oral intake:  Yes  Pain has adequate pain control using Oral analgesics:  No,  Reason:  Denies pain  Does patient have an identified :  Yes  Has goal D/C date and time been discussed with patient:  Yes

## 2023-10-04 NOTE — ANESTHESIA PROCEDURE NOTES
Airway       Patient location: [facility] - Operating Room or Procedural Area.       Procedure Start/Stop Times: 10/4/2023 9:48 AM and 10/4/2023 9:48 AM  Staff -        CRNA: Crispin Gomez APRN CRNA       Performed By: CRNAIndications and Patient Condition       Indications for airway management: paige-procedural and airway protection       Induction type:intravenous       Mask difficulty assessment: 2 - vent by mask + OA or adjuvant +/- NMBA    Final Airway Details       Final airway type: endotracheal airway       Successful airway: ETT - single and Oral  Endotracheal Airway Details        ETT size (mm): 7.0       Cuffed: yes       Successful intubation technique: video laryngoscopy       VL Blade Size: Glidescope 3       Grade View of Cords: 1       Adjucts: stylet       Position: Center       Measured from: gums/teeth       Secured at (cm): 21       Bite block used: None    Post intubation assessment        Placement verified by: capnometry, equal breath sounds and chest rise        Number of attempts at approach: 1       Number of other approaches attempted: 0       Secured with: pink tape       Ease of procedure: easy       Dentition: Intact and Unchanged    Medication(s) Administered   Medication Administration Time: 10/4/2023 9:48 AM    Additional Comments         Routine paige-procedural airway protection.     Glidescope LoPro 3.    7.0 mm ID endotracheal tube.

## 2023-10-04 NOTE — ANESTHESIA PROCEDURE NOTES
Brachial plexus Procedure Note    Pre-Procedure   Staff -        Anesthesiologist:  Chetna Murillo MD       Performed By: anesthesiologist       Location: pre-op       Pre-Anesthestic Checklist: patient identified, IV checked, site marked, risks and benefits discussed, informed consent, monitors and equipment checked, pre-op evaluation, at physician/surgeon's request and post-op pain management  Timeout:       Correct Patient: Yes        Correct Procedure: Yes        Correct Site: Yes        Correct Position: Yes        Correct Laterality: Yes        Site Marked: Yes  Procedure Documentation  Procedure: Brachial plexus       Laterality: right       Patient Position: supine       Skin prep: Chloraprep       Local skin infiltrated with 1 mL of 1% lidocaine.  (superior trunk block approach).       Needle Type: insulated       Needle Gauge: 21.        Needle Length (millimeters): 100        Ultrasound guided       1. Ultrasound was used to identify targeted nerve, plexus, vascular marker, or fascial plane and place a needle adjacent to it in real-time.       2. Ultrasound was used to visualize the spread of anesthetic in close proximity to the above referenced structure.       3. A permanent image is entered into the patient's record.       4. The visualized anatomic structures appeared normal.       5. There were no apparent abnormal pathologic findings.    Assessment/Narrative         The placement was negative for: blood aspirated, painful injection and site bleeding       Paresthesias: No.       Bolus given via needle..        Secured via.        Insertion/Infusion Method: Single Shot       Complications: none    Medication(s) Administered   Bupivacaine 0.5% w/ 1:400K Epi (Injection) - Injection   10 mL - 10/4/2023 8:32:00 AM  Bupivacaine liposome (Exparel) 1.3% LA inj susp (Infiltration) - Infiltration   10 mL - 10/4/2023 8:32:00 AM   Comments:  Bolus via needle, 10 ml of 0.5% bupivacaine with 1:400,000  "epinephrine and 10 ml liposomal bupivacaine.  Patient tolerated well, was mildly sedated but communicative throughout the procedure.   The surgeon has given a verbal order transferring care of this patient to me for the performance of regional analgesia block for post op pain control. It is requested of me because I am uniquely trained and qualified to perform this block and the surgeon is neither trained nor qualified to perform this procedure.         FOR [facility] ([facility]) ONLY:   Pain Team Contact information: please page the Pain Team Via [employer] Search \"Pain\". During daytime hours, please page the attending first. At night please page the resident first.      "

## 2023-10-04 NOTE — OP NOTE
Procedure Date: 10/4/2023     PREOPERATIVE DIAGNOSIS:  Right shoulder cuff tear arthropathy     POSTOPERATIVE DIAGNOSIS:  Right shoulder cuff tear arthropathy     PROCEDURE:  Right reverse total shoulder arthroplasty.     SURGEON:  Alejandro Mccullough MD     FIRST ASSISTANT:  JENNA Valero     A skilled first assistant was necessary for patient positioning, prepping and draping, help with soft tissue retraction, help with leg positioning, reduction maneuvers, closing, and patient transfer.     ANESTHESIA:  General and block     COMPLICATIONS:  None apparent.     ESTIMATED BLOOD LOSS:  100    IMPLANTS:  Implant Name Type Inv. Item Serial No.  Lot No. LRB No. Used Action   BASEPLATE LATERAL RVRS 25MM OFFS +3MM [id number] - [id number] Total Joint Component/Insert BASEPLATE LATERAL RVRS 25MM OFFS +3MM [id number] [employer]  Right 1 Implanted   SPHERE GLND 33MM TORNIER AQLS PRFRM +3MM SHLDR LATERALIZE - [id number] Total Joint Component/Insert SPHERE GLND 33MM TORNIER AQLS PRFRM +3MM SHLDR LATERALIZE [id number] [employer]  Right 1 Implanted   SCREW CENTRAL 6.5X30MM [id number] - [id number] Metallic Hardware/[place] SCREW CENTRAL 6.5X30MM [id number]  [employer]  Right 1 Implanted   SCREW PERIPHERAL 5.0X30MM [id number] - [id number] Metallic Hardware/[place] SCREW PERIPHERAL 5.0X30MM [id number]  [employer]  Right 1 Implanted   SCREW PERIPHERAL 22MM - [id number] Metallic Hardware/[place] SCREW PERIPHERAL 22MM  [employer]  Right 1 Implanted   TORNIER PERFORM HUMERAL SYSTEM, REVERSD INSERT, THICKNESS +0 MM, SIZE 1/2, 33MM Total Joint Component/Insert  [id number] [employer]  Right 1 Implanted   TORNIER PERFORM HUMERAL SYSTEM HUMERAL STEM, STD, SHORT, SIZE 1 Total Joint Component/Insert  [id number] [employer]  Right 1 Implanted        INDICATIONS FOR PROCEDURE:  The patient is a 89 year old female who has been complaining of progressively worsening right shoulder pain .  Patient has failed conservative measures and  after discussion of treatment options with the patient, we decided the best way to move forward would be a right reverse total shoulder arthroplasty.  Patient agreed to proceed.     DESCRIPTION OF PROCEDURE:  On the day of the procedure, the patient was met in the preoperative area by the Surgery and Anesthesia team.  The right shoulder was marked by the operative surgeon.  Informed consent was obtained.  Risks and benefits of the surgery were discussed with the patient, including risk of bleeding, infection, damage to neurovascular structures, stiffness, continued pain, dislocation, blood clots, fracture and fracture propagation, and need for future revision surgery, as well as risk of anesthesia.  Patient understood and agreed to proceed.     Our anesthesia colleagues then performed a block. Patient was then brought to the operating room and general anesthesia was administered. Patient was placed into a semi-beach chair position and the right shoulder was prepped and draped in the usual sterile fashion. Preoperative antibiotics given and preoperative timeout was performed. We began the procedure by making a deltopectoral incision. Sharp dissection was carried down through the skin and subcutaneous tissue and the cephalic vein was visualized and mobilized laterally. The deltopectoral interval was developed and the subacromial and subdeltoid adhesions were mobilized. Appropriate retractors were placed. The lateral aspect of the conjoined tendon was visualized and mobilized and the CA ligament was released off the coracoid. The clavipectoral fascia was excised as well and the 3 sisters were visualized and coagulated and tied off. The biceps was previously torn. The subscapularis was tagged and peeled off the lesser tuberosity and the capsule was peeled off all the way to the 7 o'clock position off the humeral head.  A 4-sided subscapularis release was then performed and the subscap was tucked into the subscapularis  fossa and protected with a Ray-Mina for a later repair. The shoulder was then dislocated. The humeral head was bare due to prior cuff tearing.  The supraspinatus and infraspinatus were completely torn. An anatomic neck cut was using an intramedullary guide at 30 degrees. We then used the Perform humeral system in order to place a pin into the center of the humeral head cut and over-reamed using a size 1 reamer. A trial implant was then placed which had great fit. We then turned our attention to the glenoid. The glenoid was visualized. The labrum excised circumferentially. We released the capsule circumferentially as well and we visualized the glenoid quite well. Excess cartilage was removed down to bone. A 10 degree tilted guide was then used to place the pin into the inferior center of the glenoid. The glenoid was very small. We then overreamed, overdrilled, and placed the baseplate it into position. Two peripheral locking screws were placed for rotational stability and the glenosphere was then impacted into position, and we turned our attention back to the proximal humerus. We trialed with a small poly and reduced the shoulder. The fit was excellent as was the stability. The trial implants were removed. Drill holes were made about the lesser tuberosity and #2 FiberWires were passed in NiceLoop fashion for a later subscap repair. The true implants were assembled on the back table and then impacted into position. The shoulder was reduced. It was taken through a full range of motion. There were no signs of impingement or any instability. We then repaired the subscapularis back to the lesser tuberosity using Nice knots with the FiberWire and washed the wound out copiously. The deltopectoral interval was then loosely reapproximated using Vicryl followed by layered closure of the skin. Sterile dressings were applied. The patient was then placed into an UltraSling, awakened from anesthesia and brought to the PACU for  recovery. Postoperatively, the patient will be in the sling for approximately 3 weeks. The patient will spend the night in the hospital and be discharged home on postop day 1, with a followup in clinic in 2 weeks.      Alejandro Mccullough MD

## 2023-10-04 NOTE — BRIEF OP NOTE
[facility]    Brief Operative Note    Pre-operative diagnosis: Complete rotator cuff tear or rupture of right shoulder, not specified as traumatic [M75.121]  Post-operative diagnosis Same as pre-operative diagnosis    Procedure: RIGHT SHOULDER REVERSE TOTAL SHOULDER ARTHROPLASTY, Right - Shoulder    Surgeon: Surgeon(s) and Role:     * Alejandro Mccullough MD - Primary     * Gail Gomez PA-C - Assisting  Anesthesia: General with Block   Estimated Blood Loss: 100 CCs  Drains: None  Specimens: * No specimens in log *  Findings:   None.  Complications: None.  Implants:   Implant Name Type Inv. Item Serial No.  Lot No. LRB No. Used Action   BASEPLATE LATERAL RVRS 25MM OFFS +3MM [id number] - [id number] Total Joint Component/Insert BASEPLATE LATERAL RVRS 25MM OFFS +3MM [id number] [employer]  Right 1 Implanted   SPHERE GLND 33MM TORNIER AQLS PRFRM +3MM SHLDR LATERALIZE - [id number] Total Joint Component/Insert SPHERE GLND 33MM TORNIER AQLS PRFRM +3MM SHLDR LATERALIZE [id number] [employer]  Right 1 Implanted   SCREW CENTRAL 6.5X30MM [id number] - [id number] Metallic Hardware/[place] SCREW CENTRAL 6.5X30MM [id number]  [employer]  Right 1 Implanted   SCREW PERIPHERAL 5.0X30MM [id number] - [id number] Metallic Hardware/[place] SCREW PERIPHERAL 5.0X30MM [id number]  [employer]  Right 1 Implanted   SCREW PERIPHERAL 22MM - [id number] Metallic Hardware/[place] SCREW PERIPHERAL 22MM  [employer]  Right 1 Implanted   TORNIER PERFORM HUMERAL SYSTEM, REVERSD INSERT, THICKNESS +0 MM, SIZE 1/2, 33MM Total Joint Component/Insert  [id number] [employer]  Right 1 Implanted   TORNIER PERFORM HUMERAL SYSTEM HUMERAL STEM, STD, SHORT, SIZE 1 Total Joint Component/Insert  [id number] [employer]  Right 1 Implanted     Plan:  NWB to RUE  Ultrasling x 3 weeks  DVT prophylaxis - SCDs & ASA EC 81 mg PO BID x 4 weeks   Ancef x 24 hours  PACU XRs  PT/OT - Codman's and pendulum swing exercises. Okay to work on  PROM; FF 0 - 90, ER 0 - 30.   Keep dressing C/D/I for 1 week; okay to shower    Follow up with Tarsha Gomez PA-C in clinic in 2 weeks.

## 2023-10-05 ENCOUNTER — APPOINTMENT (OUTPATIENT)
Dept: OCCUPATIONAL THERAPY | Facility: CLINIC | Age: 88
End: 2023-10-05
Attending: ORTHOPAEDIC SURGERY
Payer: MEDICARE

## 2023-10-05 VITALS
HEIGHT: 63 IN | WEIGHT: 101.1 LBS | DIASTOLIC BLOOD PRESSURE: 66 MMHG | SYSTOLIC BLOOD PRESSURE: 148 MMHG | RESPIRATION RATE: 16 BRPM | BODY MASS INDEX: 17.91 KG/M2 | HEART RATE: 60 BPM | OXYGEN SATURATION: 90 % | TEMPERATURE: 98.4 F

## 2023-10-05 LAB — HGB BLD-MCNC: 11.9 G/DL (ref 11.7–15.7)

## 2023-10-05 PROCEDURE — 97535 SELF CARE MNGMENT TRAINING: CPT | Mod: GO

## 2023-10-05 PROCEDURE — 250N000011 HC RX IP 250 OP 636: Performed by: PHYSICIAN ASSISTANT

## 2023-10-05 PROCEDURE — 97110 THERAPEUTIC EXERCISES: CPT | Mod: GO

## 2023-10-05 PROCEDURE — 85018 HEMOGLOBIN: CPT | Performed by: PHYSICIAN ASSISTANT

## 2023-10-05 PROCEDURE — 97165 OT EVAL LOW COMPLEX 30 MIN: CPT | Mod: GO

## 2023-10-05 PROCEDURE — 36415 COLL VENOUS BLD VENIPUNCTURE: CPT | Performed by: PHYSICIAN ASSISTANT

## 2023-10-05 PROCEDURE — 250N000013 HC RX MED GY IP 250 OP 250 PS 637: Performed by: PHYSICIAN ASSISTANT

## 2023-10-05 RX ADMIN — BRIMONIDINE TARTRATE 1 DROP: 2 SOLUTION/ DROPS OPHTHALMIC at 09:03

## 2023-10-05 RX ADMIN — AMLODIPINE BESYLATE 2.5 MG: 2.5 TABLET ORAL at 08:59

## 2023-10-05 RX ADMIN — ASPIRIN 81 MG: 81 TABLET, COATED ORAL at 08:59

## 2023-10-05 RX ADMIN — SENNOSIDES AND DOCUSATE SODIUM 1 TABLET: 50; 8.6 TABLET ORAL at 08:59

## 2023-10-05 RX ADMIN — ACETAMINOPHEN 975 MG: 325 TABLET, FILM COATED ORAL at 04:33

## 2023-10-05 RX ADMIN — ESCITALOPRAM OXALATE 5 MG: 5 TABLET, FILM COATED ORAL at 09:00

## 2023-10-05 RX ADMIN — DORZOLAMIDE HCL 1 DROP: 20 SOLUTION/ DROPS OPHTHALMIC at 09:03

## 2023-10-05 RX ADMIN — CEFAZOLIN 1 G: 1 INJECTION, POWDER, FOR SOLUTION INTRAMUSCULAR; INTRAVENOUS at 02:09

## 2023-10-05 RX ADMIN — TRAMADOL HYDROCHLORIDE 50 MG: 50 TABLET, COATED ORAL at 00:41

## 2023-10-05 ASSESSMENT — ACTIVITIES OF DAILY LIVING (ADL)
ADLS_ACUITY_SCORE: 18
ADLS_ACUITY_SCORE: 18
ADLS_ACUITY_SCORE: 21
ADLS_ACUITY_SCORE: 18
ADLS_ACUITY_SCORE: 21
ADLS_ACUITY_SCORE: 18
PREVIOUS_RESPONSIBILITIES: MEAL PREP;HOUSEKEEPING;LAUNDRY;MEDICATION MANAGEMENT
ADLS_ACUITY_SCORE: 18

## 2023-10-05 NOTE — PLAN OF CARE
Occupational Therapy Discharge Summary    Reason for therapy discharge:    All goals and outcomes met, no further needs identified.    Progress towards therapy goal(s). See goals on Care Plan in [facility] electronic health record for goal details.  Goals met    Therapy recommendation(s):    Continued therapy is recommended.  Rationale/Recommendations:  Pt is functioning below baseline for I/ADL tasks such as dressing, toileting, and showering d/t pain and post-surgical precautions. Pt is able to stay with daughter for few days after surgery. She also has some assistance for heavy IADLs through the assisted living facility. It is recommended that pt go home w/ assist for bathing, heavy IADL tasks and dressing/sling as needed..

## 2023-10-05 NOTE — PROGRESS NOTES
10/05/23 0912   Appointment Info   Signing Clinician's Name / Credentials (OT) VIDA Murphy   Student Supervision Direct supervision provided;Therapy services provided with the co-signing licensed therapist guiding and directing the services, and providing the skilled judgement and assessment throughout the session   Rehab Comments (OT) 1 unit of eval; 1 unit of ther ex; 2 units of self care       Present no   Language English   Living Environment   People in Home alone   Current Living Arrangements apartment   Home Accessibility no concerns   Transportation Anticipated family or friend will provide   Living Environment Comments Pt lives alone in senior living center/apartment. Elevator access. Walk in shower with tub bench, grab bars, and handheld shower head. Has children close by to assist when needed.   Self-Care   Usual Activity Tolerance good   Current Activity Tolerance moderate   Regular Exercise No   Equipment Currently Used at Home cane, quad;grab bar, tub/shower;tub bench   Fall history within last six months yes   Number of times patient has fallen within last six months 1   Activity/Exercise/Self-Care Comment Typically IND w/ self-cares w/ use of AE   Instrumental Activities of Daily Living (IADL)   Previous Responsibilities meal prep;housekeeping;laundry;medication management   IADL Comments Typically IND w/ I/ADLs but has weekly help through senior living facility. Does not drive. Manages own meds using pill box   General Information   Onset of Illness/Injury or Date of Surgery 10/04/23   Referring Physician Gail Gomez PA-C   Patient/Family Therapy Goal Statement (OT) to go home   Additional Occupational Profile Info/Pertinent History of Current Problem Pt is a 89 year old female who underwent a  RIGHT SHOULDER REVERSE TOTAL SHOULDER ARTHROPLASTY   Existing Precautions/Restrictions fall;shoulder;weight bearing   Right Upper Extremity (Weight-bearing  Status) non weight-bearing (NWB)   Cognitive Status Examination   Orientation Status orientation to person, place and time   Cognitive Status Comments AO x 4. No cog concerns observed. Cont to monitor.   Visual Perception   Visual Impairment/Limitations corrective lenses full-time   Sensory   Sensory Quick Adds left-sided sensation intact   Sensory Comments Some numbness in RUE d/t nerve block after surgery. Pt reported numbness is beginning to wear off.   Posture   Posture not impaired   Range of Motion Comprehensive   General Range of Motion upper extremity range of motion deficits identified   General Upper Extremity Assessment (Range of Motion)   Comment: Upper Extremity ROM RUE ROM deficits d/t post-surgical precautions. LUE ROM WFL.   Upper Extremity: Range of Motion LUE ROM WFL;shoulder, right: UE ROM   Strength Comprehensive (MMT)   General Manual Muscle Testing (MMT) Assessment upper extremity strength deficits identified   Upper Extremity (Manual Muscle Testing)   Upper Extremity: Manual Muscle Testing (MMT) left UE strength is WFL;right shoulder strength deficit   Comment, MMT: Upper Extremity LUE strength WFL. Dec strength in RUE d/t post-surgical precautions and nerve block   Muscle Tone Assessment   Muscle Tone Quick Adds No deficits were identified   Coordination   Upper Extremity Coordination No deficits were identified   Bed Mobility   Bed Mobility supine-sit   Supine-Sit Prather (Bed Mobility) supervision   Transfers   Transfers sit-stand transfer;toilet transfer;shower transfer   Sit-Stand Transfer   Sit-Stand Prather (Transfers) contact guard   Assistive Device (Sit-Stand Transfers) cane, quad   Shower Transfer   Type (Shower Transfer) sit-stand;stand-sit   Prather Level (Shower Transfer) supervision   Shower Transfer Comments per clinical judgement   Toilet Transfer   Type (Toilet Transfer) sit-stand;stand-sit   Prather Level (Toilet Transfer) verbal cues;contact  guard;supervision   Assistive Device (Toilet Transfer) raised toilet seat   Toilet Transfer Comments Grab bars on R side. Pt does not have raised toilet seat at home. Pt educated on benefits of using raised toilet seat.   Balance   Balance Assessment no deficits were identified   Activities of Daily Living   BADL Assessment/Intervention bathing;upper body dressing;lower body dressing;grooming;toileting   Bathing Assessment/Intervention   Karnes Level (Bathing) contact guard assist;supervision   Comment, (Bathing) per clinical judgement   Upper Body Dressing Assessment/Training   Position (Upper Body Dressing) edge of bed sitting   Karnes Level (Upper Body Dressing) verbal cues;contact guard assist;supervision   Lower Body Dressing Assessment/Training   Karnes Level (Lower Body Dressing) verbal cues;supervision   Grooming Assessment/Training   Karnes Level (Grooming) supervision;verbal cues   Comment, (Grooming) per clinical judgement   Toileting   Karnes Level (Toileting) verbal cues;contact guard assist;supervision   Clinical Impression   Criteria for Skilled Therapeutic Interventions Met (OT) Yes, treatment indicated   OT Diagnosis Dec IND w/ I/ADL tasks   Influenced by the following impairments pain, fatigue, numbness from nerve block, and post-surgical precautions   OT Problem List-Impairments impacting ADL problems related to;activity tolerance impaired;mobility;range of motion (ROM);strength;pain;post-surgical precautions   Assessment of Occupational Performance 3-5 Performance Deficits   Identified Performance Deficits LB and UB dressing, toileting, showering, g/h   Planned Therapy Interventions (OT) ADL retraining;IADL retraining;ROM;strengthening;home program guidelines;progressive activity/exercise   Clinical Decision Making Complexity (OT) low complexity   Anticipated Equipment Needs Upon Discharge (OT) raised toilet seat  (Pt currently uses toilet seat to push up w/ LUE.  Recommended raised toilet seat.)   Risk & Benefits of therapy have been explained evaluation/treatment results reviewed;care plan/treatment goals reviewed;risks/benefits reviewed;current/potential barriers reviewed;participants voiced agreement with care plan;participants included;patient;daughter   OT Total Evaluation Time   OT Eval, Low Complexity Minutes (36776) 10   OT Goals   Therapy Frequency (OT) One time eval and treatment   OT Predicted Duration/Target Date for Goal Attainment 10/05/23   OT Goals Upper Body Dressing;Lower Body Dressing;Toilet Transfer/Toileting;Upper Body Bathing;Hygiene/Grooming;OT Goal 1;OT Goal 2   OT: Hygiene/Grooming supervision/stand-by assist;within precautions;Goal Met   OT: Upper Body Dressing Supervision/stand-by assist;within precautions;Goal Met   OT: Lower Body Dressing Supervision/stand-by assist;within precautions;Goal Met   OT: Toilet Transfer/Toileting Supervision/stand-by assist;cleaning and garment management;toilet transfer;using adaptive equipment;within precautions;Goal Met   OT: Goal 1 Pt will complete post-op HEP w/ RUE to progress functional use after surgery.  (goal met)   OT: Goal 2 Pt will ambulate 150 ft w/ SBA to increase activity tolerance needed for I/ADL tasks.  (goal met)   Interventions   Interventions Quick Adds Self-Care/Home Management;Therapeutic Activity;Therapeutic Procedures/Exercise   Self-Care/Home Management   Self-Care/Home Mgmt/ADL, Compensatory, Meal Prep Minutes (96962) 35   Symptoms Noted During/After Treatment (Meal Preparation/Planning Training) fatigue   Treatment Detail/Skilled Intervention Pt sitting EOB upon arrival. Agreed to OT. Pt given handout on following precautions post-op. Therapist educated pt and daughter on following precautions during self-care tasks. Practiced donning/doffing immobilizer x2 w/ min A and VC progressing to CGA. Daughter was also educated on how to don/doff immobilizer so she can help pt. Able to don shirts  x2 w/ CGA and VC. Pt wore long sleeve shirt w/ buttons along sleeves which made it difficult to don. Educated on wearing different shirt. SBA and VC to don underwear, pants, socks, and shoes w/in precautions. Sit <> stand w/ CGA using cane. Completed toilet t/f w/ CGA. Educated pt on benefits of using raised toilet seat. Ambulated to chair w/ SBA and cane. Stand <> sit w/ SBA and VC. Pt educated on benefits of using ice to help reduce swelling. Pt left sitting upright in chair with chair alarm on and all needs met.   Therapeutic Procedures/Exercise   Therapeutic Procedure: strength, endurance, ROM, flexibillity minutes (28423) 10   Symptoms Noted During/After Treatment fatigue   Treatment Detail/Skilled Intervention Pt educated on benefits of completing exercises to maintain distal ROM on RUE. Given handout on exercises. Completed pendulum, elbow flex/ext, wrist flex/ext, supination/pronation, radial/ular deviation, and grasp and release x10 reps. Pt required vc for pendulum exercises to avoid moving R shoulder. Pt still numb from nerve block so did assist RUE w/ LUE. Minor fatigue observed. Pt educated on completing these exercises 3-4 x/day.   Therapeutic Activities   Therapeutic Activity Minutes (66066) 5   Symptoms noted during/after treatment fatigue   Treatment Detail/Skilled Intervention Pt ambulated ~150 ft in hallway w/ CGA progressing to SBA w/ cane in order to improve activity tolerance needed for I/ADL tasks. Pt reported no dizziness but did report some fatigue.   OT Discharge Planning   OT Plan d/c OT   OT Discharge Recommendation (DC Rec) home with assist   OT Rationale for DC Rec Pt is functioning below baseline for I/ADL tasks such as dressing, toileting, and showering d/t pain and post-surgical precautions. Pt is able to stay with daughter for few days after surgery. She also has some assistance for heavy IADLs through the assisted living facility. It is recommended that pt go home w/ assist for  bathing, heavy IADL tasks and dressing/sling as needed.   OT Brief overview of current status CGA-SBA and VC for UB dressing; SBA for toileting   Total Session Time   Timed Code Treatment Minutes 50   Total Session Time (sum of timed and untimed services) 60

## 2023-10-05 NOTE — PLAN OF CARE
Goal Outcome Evaluation:       Summary: 10/5  [id number]  Diagnosis:right reverse total shoulder  POD#:1  Mental Status:a&o  Activity/dangleup with SBA/belt and cane  Diet:regular  Pain: tramadol for headache   Delatorre/Voiding:bathroom  Tele/Restraints/Iso:no  02/LDA:oximeter, IVF saline lock  D/C Date: Pending  Other Info:sling with abductor pillow, bruise to left clavicle and upper arm.

## 2023-10-05 NOTE — PROGRESS NOTES
"Susy Chavarria  10/5/2023  POD # 1, right reverse total shoulder     Up and moving, walking to the bathroom, doing well, tolerating PO, reports that she is tired  Denies CP or SOB  Blood pressure (!) 148/66, pulse 60, temperature 98.4  F (36.9  C), temperature source Oral, resp. rate 16, height 1.6 m (5' 3\"), weight 45.9 kg (101 lb 1.6 oz), SpO2 90 %.  Temperatures:  Current - Temp: 98.4  F (36.9  C); Max - Temp  Av  F (36.7  C)  Min: 97.5  F (36.4  C)  Max: 98.5  F (36.9  C)  Pulse range: Pulse  Av.8  Min: 48  Max: 78  Blood pressure range: Systolic (24hrs), Av , Min:134 , Max:168   ; Diastolic (24hrs), Av, Min:57, Max:90    CMS: dressing CDI, there is a small bruising to left anterior cervical, good  strength bilaterally, good radial pulse   Labs:  Hemoglobin   Date Value Ref Range Status   10/05/2023 11.9 11.7 - 15.7 g/dL Final   03/15/2021 13.5 11.7 - 15.7 g/dL Final   ]  Lab Results   Component Value Date    INR 1.46 (H) 2008     Lab Results   Component Value Date     03/15/2021       PLAN:  Discharge home today pending PT clearance  Follow up with Dr. Mccullough in 10-14 days as outpatient      Melisa Smith   "

## 2023-10-05 NOTE — PLAN OF CARE
Goal Outcome Evaluation:      Plan of Care Reviewed With: patient, child    Overall Patient Progress: improvingOverall Patient Progress: improving    Outcome Evaluation: Discharge to Home 10/05/2023 w/ Daughter.    Patient vital signs are at baseline: Yes  Patient able to ambulate as they were prior to admission or with assist devices provided by therapies during their stay:  Yes  Patient MUST void prior to discharge:  Yes  Patient able to tolerate oral intake:  Yes  Pain has adequate pain control using Oral analgesics:  Yes  Does patient have an identified :  Yes  Has goal D/C date and time been discussed with patient:  Yes    A&Ox4.   CMS Intact except Right hand thumb, pointer finger numbness.   VSS on RA.   Up with Ax1 GB and cane.   Voiding in BR.   Pain controlled with Tylenol.   Continue to monitor.    Reviewed discharge instructions and medications with patient:YES  Questions answered:YES  Patient discharged to:Home w/ Daughter  All belongs discharged with patient:YES

## 2024-12-03 NOTE — OP NOTE
Procedure Date: 07/18/2022    PREOPERATIVE DIAGNOSIS:  End-stage osteoarthrosis of the left hip.    POSTOPERATIVE DIAGNOSIS:  End-stage osteoarthrosis of the left hip.    PROCEDURE:  Left total hip arthroplasty with MIS technique.    SURGEON:  Fortino Zapien MD.    ASSISTANT:  GURWINDER Bueno OPA-C.    ANESTHESIA:  General.    ESTIMATED BLOOD LOSS:  100 mL.    COMPLICATIONS:  None.    IMPLANT:  DePuy Wilbur cup size 48.  Liner:  Neutral liner.  A standard 1.532 mm cobalt chrome head.  The Stem was a press-fit size 2, high offset.    COMPLICATIONS:  None.      TXA was given in the preoperative holding area.    OTHER MEDICATIONS:  750 mg of powdered vancomycin was placed intraarticularly at the end.      DRAINS:  No drains.    DEVICES:  Sequential calf compression devices were used.    DESCRIPTION OF PROCEDURE:  Timeout was requested.  Brought to the operating room, prepped and draped in the usual fashion.  Lateral decubitus, left hip up.  The patient is a very petite size.  Very careful care was done to provide fractures.      I then proceeded to go ahead and a straight lateral incision made, followed the gluteus, split the deep fascia.  A self-retaining retractor placed.  Tagged the quadratus with two #0 Ethibond tags, short external rotators were tagged with #5 FiberWire reflected posteriorly.  Capsule sutured to that as well.    Dislocated the hip out.  End-stage arthrosis was noted.  Hypertrophic changes and calcifications along the labrum.  Resected about a fingerbreadth above the lesser trochanter.  The patient was about 3/4 of an inch long on the opposite side and has been for many years.  Capsulotomy performed; preserved the posterior capsule for later repair, as well as the short external rotators.  She had reasonably good bone.  Reamed up to 47, with a 48 cup in there.  No obvious fractures seen, according to her anatomic anteversion.  One screw was all we needed, it seated well.  We then  proceeded to put a trial liner in and brought the femoral head up into the wound.  We carefully used a cookie cutter, and then reamed carefully up to size 2; there was just no way to get a 3 in there, so the trials were then done with a 0, 2 and 2.      Two bottomed out,   , which is what I wanted because we could regain the length safer this way.  She was such a small statured person, I think she is 95 pounds.  I could not cement one, with just stem sizes were such, we would not have had any space for cement mantle, so I elected to go with a press-fit stem.  Anteverted about 15 degrees.  A trial liner was replaced with the permanent one.  Everything was fit well.  There was a size 2 stem, maybe 2-3 mm proud, and with her small stature, the cup itself added length.    It was very, very stable.  Went through the length as stated.  Did a trial with +5, it seemed to be way too tight.    The lengths were continuously measured.  They seemed to be back up to equal.  This could be a little bit tight, as she has had inequality for 20 years.  Irrigated copiously, Betadine bath, followed by irrigated with saline, injected with Marcaine, total volume of pain cocktail 50 mL diffusely.  I put powdered vancomycin in the joint.  Drilled through the trochanter with a FiberWire, reattached that 2 places, the quadratus was reattached to the femur with 2 places.  Then injected Marcaine, lidocaine, Toradol mixture there.  The deep fascia, 0 Vicryl, 2-0 Vicryl, 3-0 Monocryl and a mesh dressing.   mL at most.  Complications:  None.  Did use some tranexamic acid preop.  Antibiotics were given preoperatively within 30 minutes of incision.  Time per protocol.  The patient was transferred awake and alert to recovery room.  Again the prosthesis was a DePuy Spring cup size 48, stem was a size 2 high offset,   Cobalt  Chrome  head was +1.5 x 32 diameter and one 6.5 x 30 mm screw placed through the cup to secure it.  No  complications.    Fortino Zapien MD        D: 2022   T: 2022   MT: [id number]    Name:     MJ RAMOS  MRN:      [id number]        Account:        [id number]   :      1932           Procedure Date: 2022     Document: [id number]     Showering allowed/No heavy lifting/straining/Follow Instructions Provided by your Surgical Team

## (undated) DEVICE — NDL 19GA 1.5"

## (undated) DEVICE — BLADE SAW SAGITTAL STRK 18X90X1.27MM HD SYS 6 6118-127-090

## (undated) DEVICE — LINEN TOWEL PACK X5 5464

## (undated) DEVICE — COVER TABLE POLY 65X90" 8186

## (undated) DEVICE — BONE CLEANING TIP INTERPULSE  0210-010-000

## (undated) DEVICE — DRAPE STERI TOWEL LG 1010

## (undated) DEVICE — GLOVE BIOGEL PI MICRO INDICATOR UNDERGLOVE SZ 8.5 48985

## (undated) DEVICE — GUIDEWIRE TORNIER AEQUALIS PERFORM +  2.5X220MM DWD017

## (undated) DEVICE — DRSG TEGADERM 4X4 3/4" 1626

## (undated) DEVICE — SYR 10ML FINGER CONTROL W/O NDL 309695

## (undated) DEVICE — SU NDL MAYO TROCAR MED 217003

## (undated) DEVICE — DEVICE RETRIEVER HEWSON 71111579

## (undated) DEVICE — DRSG STERI STRIP 1/2X4" R1547

## (undated) DEVICE — SU MONOCRYL 3-0 PS-2 27" Y427H

## (undated) DEVICE — SOL WATER IRRIG 1000ML BOTTLE 2F7114

## (undated) DEVICE — GLOVE PROTEXIS W/NEU-THERA 8.5  2D73TE85

## (undated) DEVICE — SUCTION IRR SYSTEM W/O TIP INTERPULSE HANDPIECE 0210-100-000

## (undated) DEVICE — SU VICRYL 0 CTX 36" J370H

## (undated) DEVICE — GLOVE PROTEXIS POWDER FREE 8.0 ORTHOPEDIC 2D73ET80

## (undated) DEVICE — SU VICRYL 2-0 CP-1 27" UND J266H

## (undated) DEVICE — SU DERMABOND PRINEO CLR602US

## (undated) DEVICE — GLOVE BIOGEL PI SZ 8.5 40885

## (undated) DEVICE — PREP SKIN SCRUB TRAY 4461A

## (undated) DEVICE — DRAPE IOBAN INCISE 23X17" 6650EZ

## (undated) DEVICE — DRAPE IOBAN INCISE 36X23" 6651EZ

## (undated) DEVICE — SU MONOCRYL 4-0 PS-2 18" UND Y496G

## (undated) DEVICE — MANIFOLD NEPTUNE 4 PORT 700-20

## (undated) DEVICE — NDL 22GA 1.5"

## (undated) DEVICE — SU VICRYL 0 CT-1 27" J340H

## (undated) DEVICE — GUIDE PIN STERILE 3X75MM

## (undated) DEVICE — SOL NACL 0.9% IRRIG 1000ML BOTTLE 2F7124

## (undated) DEVICE — HOOD SURG T7PLUS PEEL AWAY FACE SHIELD STRL LF 0416-801-100

## (undated) DEVICE — Device

## (undated) DEVICE — SPONGE LAP 18X18" X8435

## (undated) DEVICE — SU STRATAFIX PDS PLUS 2-0 SPIRAL CT-1 30CM SXPP1B410

## (undated) DEVICE — DRILL BIT PERIPHERAL SCREW 3.2MM MWJ126

## (undated) DEVICE — SU VICRYL 2-0 CT-2 27" UND J269H

## (undated) DEVICE — SU ETHIBOND 2 V-37 4X30" MX69G

## (undated) DEVICE — SU FIBERWIRE 2 38"  AR-7200

## (undated) DEVICE — GLOVE PROTEXIS POWDER FREE 8.5 ORTHOPEDIC 2D73ET85

## (undated) DEVICE — GLOVE PROTEXIS BLUE W/NEU-THERA 8.5  2D73EB85

## (undated) DEVICE — PREP CHLORAPREP 26ML TINTED HI-LITE ORANGE 930815

## (undated) DEVICE — PACK OPEN SHOULDER SOP15OCFSC

## (undated) DEVICE — DRSG AQUACEL AG 3.5X9.75" HYDROFIBER 412011

## (undated) DEVICE — DECANTER BAG 2002S

## (undated) DEVICE — ESU GROUND PAD UNIVERSAL W/O CORD

## (undated) DEVICE — SUCTION TIP YANKAUER STR K87

## (undated) DEVICE — GLOVE PROTEXIS W/NEU-THERA 8.0  2D73TE80

## (undated) DEVICE — SU ETHIBOND 0 CTX CR  8X18" CX31D

## (undated) DEVICE — BLADE SAW SAGITTAL STRK 25X89.5X0.96MM 4/2000 2108-393-005

## (undated) DEVICE — PACK TOTAL HIP W/U DRAPE SOP15HUFSC

## (undated) RX ORDER — GLYCOPYRROLATE 0.2 MG/ML
INJECTION, SOLUTION INTRAMUSCULAR; INTRAVENOUS
Status: DISPENSED
Start: 2023-10-04

## (undated) RX ORDER — ONDANSETRON 2 MG/ML
INJECTION INTRAMUSCULAR; INTRAVENOUS
Status: DISPENSED
Start: 2022-07-18

## (undated) RX ORDER — FENTANYL CITRATE 0.05 MG/ML
INJECTION, SOLUTION INTRAMUSCULAR; INTRAVENOUS
Status: DISPENSED
Start: 2022-07-18

## (undated) RX ORDER — FENTANYL CITRATE 50 UG/ML
INJECTION, SOLUTION INTRAMUSCULAR; INTRAVENOUS
Status: DISPENSED
Start: 2022-07-18

## (undated) RX ORDER — DEXAMETHASONE SODIUM PHOSPHATE 4 MG/ML
INJECTION, SOLUTION INTRA-ARTICULAR; INTRALESIONAL; INTRAMUSCULAR; INTRAVENOUS; SOFT TISSUE
Status: DISPENSED
Start: 2022-07-18

## (undated) RX ORDER — HYDROMORPHONE HYDROCHLORIDE 1 MG/ML
INJECTION, SOLUTION INTRAMUSCULAR; INTRAVENOUS; SUBCUTANEOUS
Status: DISPENSED
Start: 2022-07-18

## (undated) RX ORDER — FENTANYL CITRATE 0.05 MG/ML
INJECTION, SOLUTION INTRAMUSCULAR; INTRAVENOUS
Status: DISPENSED
Start: 2023-10-04

## (undated) RX ORDER — TRANEXAMIC ACID 650 MG/1
TABLET ORAL
Status: DISPENSED
Start: 2023-10-04

## (undated) RX ORDER — CEFAZOLIN SODIUM/WATER 2 G/20 ML
SYRINGE (ML) INTRAVENOUS
Status: DISPENSED
Start: 2023-10-04

## (undated) RX ORDER — VANCOMYCIN HYDROCHLORIDE 1 G/20ML
INJECTION, POWDER, LYOPHILIZED, FOR SOLUTION INTRAVENOUS
Status: DISPENSED
Start: 2022-07-18

## (undated) RX ORDER — PROPOFOL 10 MG/ML
INJECTION, EMULSION INTRAVENOUS
Status: DISPENSED
Start: 2023-10-04

## (undated) RX ORDER — BUPIVACAINE HYDROCHLORIDE AND EPINEPHRINE 2.5; 5 MG/ML; UG/ML
INJECTION, SOLUTION EPIDURAL; INFILTRATION; INTRACAUDAL; PERINEURAL
Status: DISPENSED
Start: 2023-10-04

## (undated) RX ORDER — FENTANYL CITRATE 50 UG/ML
INJECTION, SOLUTION INTRAMUSCULAR; INTRAVENOUS
Status: DISPENSED
Start: 2023-10-04

## (undated) RX ORDER — PROPOFOL 10 MG/ML
INJECTION, EMULSION INTRAVENOUS
Status: DISPENSED
Start: 2022-07-18

## (undated) RX ORDER — TRANEXAMIC ACID 650 MG/1
TABLET ORAL
Status: DISPENSED
Start: 2022-07-18

## (undated) RX ORDER — LIDOCAINE HYDROCHLORIDE 20 MG/ML
INJECTION, SOLUTION EPIDURAL; INFILTRATION; INTRACAUDAL; PERINEURAL
Status: DISPENSED
Start: 2022-07-18